# Patient Record
Sex: MALE | Race: WHITE | Employment: OTHER | URBAN - METROPOLITAN AREA
[De-identification: names, ages, dates, MRNs, and addresses within clinical notes are randomized per-mention and may not be internally consistent; named-entity substitution may affect disease eponyms.]

---

## 2017-10-28 ENCOUNTER — HOSPITAL ENCOUNTER (INPATIENT)
Age: 77
LOS: 7 days | Discharge: HOME HEALTH | DRG: 234 | End: 2017-11-04
Attending: INTERNAL MEDICINE | Admitting: INTERNAL MEDICINE
Payer: MEDICARE

## 2017-10-28 PROCEDURE — 2060000000 HC ICU INTERMEDIATE R&B

## 2017-10-29 LAB
ANION GAP SERPL CALCULATED.3IONS-SCNC: 16 MMOL/L (ref 9–17)
BUN BLDV-MCNC: 15 MG/DL (ref 8–23)
BUN/CREAT BLD: NORMAL (ref 9–20)
CALCIUM SERPL-MCNC: 9.2 MG/DL (ref 8.6–10.4)
CHLORIDE BLD-SCNC: 100 MMOL/L (ref 98–107)
CO2: 23 MMOL/L (ref 20–31)
CREAT SERPL-MCNC: 0.78 MG/DL (ref 0.7–1.2)
ESTIMATED AVERAGE GLUCOSE: 111 MG/DL
GFR AFRICAN AMERICAN: >60 ML/MIN
GFR NON-AFRICAN AMERICAN: >60 ML/MIN
GFR SERPL CREATININE-BSD FRML MDRD: NORMAL ML/MIN/{1.73_M2}
GFR SERPL CREATININE-BSD FRML MDRD: NORMAL ML/MIN/{1.73_M2}
GLUCOSE BLD-MCNC: 98 MG/DL (ref 70–99)
HBA1C MFR BLD: 5.5 % (ref 4–6)
HCT VFR BLD CALC: 39.4 % (ref 41–53)
HEMOGLOBIN: 13 G/DL (ref 13.5–17.5)
MCH RBC QN AUTO: 28.5 PG (ref 26–34)
MCHC RBC AUTO-ENTMCNC: 33 G/DL (ref 31–37)
MCV RBC AUTO: 86.4 FL (ref 80–100)
PARTIAL THROMBOPLASTIN TIME: 25 SEC (ref 21.3–31.3)
PARTIAL THROMBOPLASTIN TIME: 38.6 SEC (ref 21.3–31.3)
PARTIAL THROMBOPLASTIN TIME: 49.1 SEC (ref 21.3–31.3)
PDW BLD-RTO: 16.8 % (ref 12.5–15.4)
PLATELET # BLD: 298 K/UL (ref 140–450)
PMV BLD AUTO: 7.4 FL (ref 6–12)
POTASSIUM SERPL-SCNC: 3.8 MMOL/L (ref 3.7–5.3)
RBC # BLD: 4.56 M/UL (ref 4.5–5.9)
SODIUM BLD-SCNC: 139 MMOL/L (ref 135–144)
TROPONIN INTERP: ABNORMAL
TROPONIN T: 0.23 NG/ML
TROPONIN T: 0.26 NG/ML
TROPONIN T: 0.34 NG/ML
TROPONIN T: 0.35 NG/ML
WBC # BLD: 9.7 K/UL (ref 3.5–11)

## 2017-10-29 PROCEDURE — 93005 ELECTROCARDIOGRAM TRACING: CPT

## 2017-10-29 PROCEDURE — 6360000002 HC RX W HCPCS: Performed by: STUDENT IN AN ORGANIZED HEALTH CARE EDUCATION/TRAINING PROGRAM

## 2017-10-29 PROCEDURE — 36415 COLL VENOUS BLD VENIPUNCTURE: CPT

## 2017-10-29 PROCEDURE — 83036 HEMOGLOBIN GLYCOSYLATED A1C: CPT

## 2017-10-29 PROCEDURE — 85730 THROMBOPLASTIN TIME PARTIAL: CPT

## 2017-10-29 PROCEDURE — 6370000000 HC RX 637 (ALT 250 FOR IP): Performed by: STUDENT IN AN ORGANIZED HEALTH CARE EDUCATION/TRAINING PROGRAM

## 2017-10-29 PROCEDURE — 2060000000 HC ICU INTERMEDIATE R&B

## 2017-10-29 PROCEDURE — 80048 BASIC METABOLIC PNL TOTAL CA: CPT

## 2017-10-29 PROCEDURE — 6370000000 HC RX 637 (ALT 250 FOR IP): Performed by: INTERNAL MEDICINE

## 2017-10-29 PROCEDURE — 85027 COMPLETE CBC AUTOMATED: CPT

## 2017-10-29 PROCEDURE — 84484 ASSAY OF TROPONIN QUANT: CPT

## 2017-10-29 RX ORDER — ASPIRIN 81 MG/1
81 TABLET, CHEWABLE ORAL DAILY
Status: DISCONTINUED | OUTPATIENT
Start: 2017-10-29 | End: 2017-10-31

## 2017-10-29 RX ORDER — LISINOPRIL 2.5 MG/1
2.5 TABLET ORAL DAILY
Status: DISCONTINUED | OUTPATIENT
Start: 2017-10-29 | End: 2017-10-30

## 2017-10-29 RX ORDER — ASPIRIN 81 MG/1
162 TABLET, CHEWABLE ORAL ONCE
Status: COMPLETED | OUTPATIENT
Start: 2017-10-29 | End: 2017-10-29

## 2017-10-29 RX ORDER — ATORVASTATIN CALCIUM 40 MG/1
40 TABLET, FILM COATED ORAL NIGHTLY
Status: DISCONTINUED | OUTPATIENT
Start: 2017-10-29 | End: 2017-10-31

## 2017-10-29 RX ORDER — HEPARIN SODIUM 1000 [USP'U]/ML
4000 INJECTION, SOLUTION INTRAVENOUS; SUBCUTANEOUS PRN
Status: DISCONTINUED | OUTPATIENT
Start: 2017-10-29 | End: 2017-10-31

## 2017-10-29 RX ORDER — HEPARIN SODIUM 10000 [USP'U]/100ML
12 INJECTION, SOLUTION INTRAVENOUS CONTINUOUS
Status: DISCONTINUED | OUTPATIENT
Start: 2017-10-29 | End: 2017-10-30

## 2017-10-29 RX ORDER — NITROGLYCERIN 0.4 MG/1
0.4 TABLET SUBLINGUAL EVERY 5 MIN PRN
Status: DISCONTINUED | OUTPATIENT
Start: 2017-10-29 | End: 2017-10-31

## 2017-10-29 RX ORDER — HEPARIN SODIUM 1000 [USP'U]/ML
2000 INJECTION, SOLUTION INTRAVENOUS; SUBCUTANEOUS PRN
Status: DISCONTINUED | OUTPATIENT
Start: 2017-10-29 | End: 2017-10-31

## 2017-10-29 RX ADMIN — HEPARIN SODIUM 2000 UNITS: 1000 INJECTION, SOLUTION INTRAVENOUS; SUBCUTANEOUS at 17:35

## 2017-10-29 RX ADMIN — ASPIRIN 81 MG: 81 TABLET, CHEWABLE ORAL at 09:47

## 2017-10-29 RX ADMIN — METOPROLOL TARTRATE 12.5 MG: 25 TABLET ORAL at 20:31

## 2017-10-29 RX ADMIN — ASPIRIN 81 MG 162 MG: 81 TABLET ORAL at 00:46

## 2017-10-29 RX ADMIN — HEPARIN SODIUM AND DEXTROSE 12 UNITS/KG/HR: 10000; 5 INJECTION INTRAVENOUS at 02:19

## 2017-10-29 RX ADMIN — HEPARIN SODIUM AND DEXTROSE 14.04 UNITS/KG/HR: 10000; 5 INJECTION INTRAVENOUS at 17:31

## 2017-10-29 RX ADMIN — LISINOPRIL 2.5 MG: 2.5 TABLET ORAL at 00:46

## 2017-10-29 RX ADMIN — ATORVASTATIN CALCIUM 40 MG: 40 TABLET, FILM COATED ORAL at 20:31

## 2017-10-29 RX ADMIN — ATORVASTATIN CALCIUM 40 MG: 40 TABLET, FILM COATED ORAL at 00:46

## 2017-10-29 RX ADMIN — HEPARIN SODIUM AND DEXTROSE 14.04 UNITS/KG/HR: 10000; 5 INJECTION INTRAVENOUS at 23:16

## 2017-10-29 RX ADMIN — HEPARIN SODIUM 4000 UNITS: 1000 INJECTION, SOLUTION INTRAVENOUS; SUBCUTANEOUS at 02:19

## 2017-10-29 RX ADMIN — METOPROLOL TARTRATE 12.5 MG: 25 TABLET ORAL at 09:47

## 2017-10-29 NOTE — H&P
Admission medications    Not on File      Current Facility-Administered Medications: lisinopril (PRINIVIL;ZESTRIL) tablet 2.5 mg, 2.5 mg, Oral, Daily  atorvastatin (LIPITOR) tablet 40 mg, 40 mg, Oral, Nightly  nitroGLYCERIN (NITROSTAT) SL tablet 0.4 mg, 0.4 mg, Sublingual, Q5 Min PRN  heparin (porcine) injection 4,000 Units, 4,000 Units, Intravenous, PRN  heparin (porcine) injection 2,000 Units, 2,000 Units, Intravenous, PRN  heparin 25,000 units in dextrose 5% 250 mL infusion, 12 Units/kg/hr, Intravenous, Continuous  metoprolol tartrate (LOPRESSOR) tablet 12.5 mg, 12.5 mg, Oral, BID  aspirin chewable tablet 81 mg, 81 mg, Oral, Daily    Allergies:  Review of patient's allergies indicates no known allergies. Social History:   reports that he has never smoked. He has never used smokeless tobacco. He reports that he does not drink alcohol. Family History: family history includes No Known Problems in his father and mother. No h/o sudden cardiac death. No for premature CAD    REVIEW OF SYSTEMS:    · Constitutional: there has been no unanticipated weight loss. There's been No change in energy level, No change in activity level. · Eyes: No visual changes or diplopia. No scleral icterus. · ENT: No Headaches  · Cardiovascular: No cardiac history  · Respiratory: No previous pulmonary problems, No cough  · Gastrointestinal: No abdominal pain. No change in bowel or bladder habits. · Genitourinary: No dysuria, trouble voiding, or hematuria. · Musculoskeletal:  No gait disturbance, No weakness or joint complaints. · Integumentary: No rash or pruritis. · Neurological: No headache, diplopia, change in muscle strength, numbness or tingling. No change in gait, balance, coordination, mood, affect, memory, mentation, behavior. · Psychiatric: No anxiety, or depression. · Endocrine: No temperature intolerance. No excessive thirst, fluid intake, or urination. No tremor.   · Hematologic/Lymphatic: No abnormal bruising or bleeding, blood clots or swollen lymph nodes. · Allergic/Immunologic: No nasal congestion or hives. PHYSICAL EXAM:      /67   Pulse 70   Temp 97.4 °F (36.3 °C) (Oral)   Resp 16   Ht 5' 11\" (1.803 m)   Wt 180 lb 9.6 oz (81.9 kg)   SpO2 97%   BMI 25.19 kg/m²    Constitutional and General Appearance: alert, cooperative, no distress and appears stated age  HEENT: PERRL, no cervical lymphadenopathy. No masses palpable. Normal oral mucosa  Respiratory:  · Normal excursion and expansion without use of accessory muscles  · Resp Auscultation: Good respiratory effort. No for increased work of breathing. On auscultation: clear to auscultation bilaterally  Cardiovascular:  · The apical impulse is not displaced  · Heart tones are crisp and normal. regular S1 and S2.  · Jugular venous pulsation Normal  · The carotid upstroke is normal in amplitude and contour without delay or bruit  · Peripheral pulses are symmetrical and full   Abdomen:   · No masses or tenderness  · Bowel sounds present  Extremities:  ·  No Cyanosis or Clubbing  ·  Lower extremity edema: No  ·  Skin: Warm and dry  Neurological:  · Alert and oriented. · Moves all extremities well  · No abnormalities of mood, affect, memory, mentation, or behavior are noted    DATA:    Diagnostics:    Not available    Labs:     CBC:   Recent Labs      10/29/17   0030   WBC  9.7   HGB  13.0*   HCT  39.4*   PLT  298     BMP:   Recent Labs      10/29/17   0030   NA  139   K  3.8   CO2  23   BUN  15   CREATININE  0.78   LABGLOM  >60   GLUCOSE  98     APTT:  Recent Labs      10/29/17   0030   APTT  25.0       IMPRESSION:    There is no problem list on file for this patient. RECOMMENDATIONS:  1.  NSTEMI with NAIMA score 3  , no risk factors other than advanced age  3. Continue heparin GTT for now , in addition to ASA. 3. Lopressor 12.5 mg twice a day , lisinopril 2.5mg   4. Lipitor 40 mg daily , daily aspirin 81 mg  5.  Nothing by mouth after

## 2017-10-30 ENCOUNTER — APPOINTMENT (OUTPATIENT)
Dept: CARDIAC CATH/INVASIVE PROCEDURES | Age: 77
DRG: 234 | End: 2017-10-30
Attending: INTERNAL MEDICINE
Payer: MEDICARE

## 2017-10-30 ENCOUNTER — ANESTHESIA EVENT (OUTPATIENT)
Dept: OPERATING ROOM | Age: 77
DRG: 234 | End: 2017-10-30
Payer: MEDICARE

## 2017-10-30 ENCOUNTER — APPOINTMENT (OUTPATIENT)
Dept: GENERAL RADIOLOGY | Age: 77
DRG: 234 | End: 2017-10-30
Attending: INTERNAL MEDICINE
Payer: MEDICARE

## 2017-10-30 LAB
BILIRUBIN URINE: NEGATIVE
COLOR: YELLOW
COMMENT UA: NORMAL
CULTURE: NORMAL
EKG ATRIAL RATE: 69 BPM
EKG P AXIS: 45 DEGREES
EKG P-R INTERVAL: 242 MS
EKG Q-T INTERVAL: 416 MS
EKG QRS DURATION: 90 MS
EKG QTC CALCULATION (BAZETT): 445 MS
EKG R AXIS: 19 DEGREES
EKG T AXIS: 36 DEGREES
EKG VENTRICULAR RATE: 69 BPM
GLUCOSE URINE: NEGATIVE
KETONES, URINE: NEGATIVE
LEUKOCYTE ESTERASE, URINE: NEGATIVE
LV EF: 53 %
LVEF MODALITY: NORMAL
Lab: NORMAL
NITRITE, URINE: NEGATIVE
PARTIAL THROMBOPLASTIN TIME: 36.1 SEC (ref 21.3–31.3)
PARTIAL THROMBOPLASTIN TIME: 60.5 SEC (ref 21.3–31.3)
PH UA: 7 (ref 5–8)
PROTEIN UA: NEGATIVE
SPECIFIC GRAVITY UA: 1.02 (ref 1–1.03)
SPECIMEN DESCRIPTION: NORMAL
STATUS: NORMAL
TURBIDITY: CLEAR
URINE HGB: NEGATIVE
UROBILINOGEN, URINE: NORMAL

## 2017-10-30 PROCEDURE — 85730 THROMBOPLASTIN TIME PARTIAL: CPT

## 2017-10-30 PROCEDURE — 6370000000 HC RX 637 (ALT 250 FOR IP): Performed by: INTERNAL MEDICINE

## 2017-10-30 PROCEDURE — 81003 URINALYSIS AUTO W/O SCOPE: CPT

## 2017-10-30 PROCEDURE — 6370000000 HC RX 637 (ALT 250 FOR IP): Performed by: STUDENT IN AN ORGANIZED HEALTH CARE EDUCATION/TRAINING PROGRAM

## 2017-10-30 PROCEDURE — 71010 XR CHEST LIMITED: CPT

## 2017-10-30 PROCEDURE — C1769 GUIDE WIRE: HCPCS

## 2017-10-30 PROCEDURE — C1725 CATH, TRANSLUMIN NON-LASER: HCPCS

## 2017-10-30 PROCEDURE — C1760 CLOSURE DEV, VASC: HCPCS

## 2017-10-30 PROCEDURE — B2151ZZ FLUOROSCOPY OF LEFT HEART USING LOW OSMOLAR CONTRAST: ICD-10-PCS | Performed by: INTERNAL MEDICINE

## 2017-10-30 PROCEDURE — 87641 MR-STAPH DNA AMP PROBE: CPT

## 2017-10-30 PROCEDURE — B2111ZZ FLUOROSCOPY OF MULTIPLE CORONARY ARTERIES USING LOW OSMOLAR CONTRAST: ICD-10-PCS | Performed by: INTERNAL MEDICINE

## 2017-10-30 PROCEDURE — 6360000002 HC RX W HCPCS: Performed by: STUDENT IN AN ORGANIZED HEALTH CARE EDUCATION/TRAINING PROGRAM

## 2017-10-30 PROCEDURE — 93458 L HRT ARTERY/VENTRICLE ANGIO: CPT | Performed by: INTERNAL MEDICINE

## 2017-10-30 PROCEDURE — 93306 TTE W/DOPPLER COMPLETE: CPT

## 2017-10-30 PROCEDURE — 6370000000 HC RX 637 (ALT 250 FOR IP): Performed by: PHYSICIAN ASSISTANT

## 2017-10-30 PROCEDURE — 36415 COLL VENOUS BLD VENIPUNCTURE: CPT

## 2017-10-30 PROCEDURE — C1894 INTRO/SHEATH, NON-LASER: HCPCS

## 2017-10-30 PROCEDURE — 99221 1ST HOSP IP/OBS SF/LOW 40: CPT | Performed by: PHYSICIAN ASSISTANT

## 2017-10-30 PROCEDURE — 6360000002 HC RX W HCPCS

## 2017-10-30 PROCEDURE — 4A023N7 MEASUREMENT OF CARDIAC SAMPLING AND PRESSURE, LEFT HEART, PERCUTANEOUS APPROACH: ICD-10-PCS | Performed by: INTERNAL MEDICINE

## 2017-10-30 PROCEDURE — 6370000000 HC RX 637 (ALT 250 FOR IP)

## 2017-10-30 PROCEDURE — 2060000000 HC ICU INTERMEDIATE R&B

## 2017-10-30 RX ORDER — SODIUM CHLORIDE 0.9 % (FLUSH) 0.9 %
10 SYRINGE (ML) INJECTION PRN
Status: DISCONTINUED | OUTPATIENT
Start: 2017-10-30 | End: 2017-10-31

## 2017-10-30 RX ORDER — CHLORHEXIDINE GLUCONATE 4 G/100ML
SOLUTION TOPICAL
Status: COMPLETED
Start: 2017-10-30 | End: 2017-10-30

## 2017-10-30 RX ORDER — SODIUM CHLORIDE 0.9 % (FLUSH) 0.9 %
10 SYRINGE (ML) INJECTION EVERY 12 HOURS SCHEDULED
Status: DISCONTINUED | OUTPATIENT
Start: 2017-10-30 | End: 2017-10-31

## 2017-10-30 RX ORDER — ACETAMINOPHEN 325 MG/1
650 TABLET ORAL EVERY 4 HOURS PRN
Status: DISCONTINUED | OUTPATIENT
Start: 2017-10-30 | End: 2017-10-31

## 2017-10-30 RX ORDER — ONDANSETRON 2 MG/ML
4 INJECTION INTRAMUSCULAR; INTRAVENOUS EVERY 6 HOURS PRN
Status: DISCONTINUED | OUTPATIENT
Start: 2017-10-30 | End: 2017-10-31

## 2017-10-30 RX ORDER — HEPARIN SODIUM 10000 [USP'U]/100ML
12 INJECTION, SOLUTION INTRAVENOUS CONTINUOUS
Status: DISCONTINUED | OUTPATIENT
Start: 2017-10-30 | End: 2017-10-31

## 2017-10-30 RX ADMIN — METOPROLOL TARTRATE 12.5 MG: 25 TABLET ORAL at 08:32

## 2017-10-30 RX ADMIN — ASPIRIN 81 MG: 81 TABLET, CHEWABLE ORAL at 08:32

## 2017-10-30 RX ADMIN — LISINOPRIL 2.5 MG: 2.5 TABLET ORAL at 08:32

## 2017-10-30 RX ADMIN — HEPARIN SODIUM 2000 UNITS: 1000 INJECTION, SOLUTION INTRAVENOUS; SUBCUTANEOUS at 23:35

## 2017-10-30 RX ADMIN — MUPIROCIN: 20 OINTMENT TOPICAL at 23:14

## 2017-10-30 RX ADMIN — ATORVASTATIN CALCIUM 40 MG: 40 TABLET, FILM COATED ORAL at 22:07

## 2017-10-30 RX ADMIN — METOPROLOL TARTRATE 12.5 MG: 25 TABLET ORAL at 22:08

## 2017-10-30 RX ADMIN — CHLORHEXIDINE GLUCONATE: 4 SOLUTION TOPICAL at 22:10

## 2017-10-30 ASSESSMENT — PAIN SCALES - GENERAL
PAINLEVEL_OUTOF10: 0

## 2017-10-30 ASSESSMENT — LIFESTYLE VARIABLES: SMOKING_STATUS: 0

## 2017-10-30 NOTE — PROCEDURES
[facility]        Date:   10/30/2017  Patient name: Hadley Crain  Date of admission:  10/28/2017 10:42 PM  MRN:   [id number]  YOB: 1940    CARDIAC CATHETERIZATION    Operators:  Primary: Dr. Idania Cuadra  Assistant: Dr. Javier Watts    Indications for cath: NSTEMI    Procedure performed: left heart cath and left ventriculogram    Access: Right Femoral artery    Procedure: After informed consent was obtained with explanation of the risks and benefits, patient was brought to the cath lab. The right groin were prepped and draped in sterile fashion. 1% lidocaine was used for local block. The Femoral artery was cannulated with 6  Fr sheath with brisk arterial blood return. The side port was frequently flushed and aspirated with normal saline. Findings:    Left main: 60% ostial stenosis                    50% distal stenosis  LAD: 75% Proximal stenosis           80% Mid stenosis  LCX: Luminal ir regularities 20-30%. RCA: 80% Proximal stenosis            RPDA: 80% Ostial stenosis    The LV gram was performed in the FELIX 30 position. LVEF: 60%. LV Wall Motion: normal    Conclusions:    1. Left main with Multivessel disease  2. Normal LV function    Recommendation:    1. CTS consult for the CABG      Electronically signed by Roxianne Osgood, MD on 10/30/2017 at 12:53 PM  [facility]. Attending Physician Statement  I have discussed the case of Hadley Crain including pertinent history and exam findings with the resident. I have seen and examined the patient and the key elements of the encounter have been performed by me. I agree with the assessment, plan and orders as documented by the resident With changes made to the note. Procedure performed by me.     Electronically signed by Ramon Nunn MD on 10/31/2017 at 10:10 AM.  [facility]      [phone]

## 2017-10-30 NOTE — CONSULTS
express understanding and wish to proceed with surgical intervention. Alona Conner  This patient was seen with James Barragan PA-C and agree with documentation as scribed. I have seen and examined the patient, agree with the assessment and management. Care plan has been discussed with pt and his family, they desire to proceed with CABG.   Erik Kwan MD

## 2017-10-30 NOTE — PLAN OF CARE
Problem: Falls - Risk of:  Goal: Will remain free from falls  Will remain free from falls   Outcome: Ongoing  Remains free from falls; wears non skid socks, fall risk education, calls out with call light when wanting to get up.

## 2017-10-30 NOTE — ANESTHESIA PRE PROCEDURE
Rheumatoid arteritis        Past Surgical History:        Procedure Laterality Date    EYE SURGERY      cataracts    JOINT REPLACEMENT Right 05/18/2017    hip reeplacement        Social History:    Social History   Substance Use Topics    Smoking status: Never Smoker    Smokeless tobacco: Never Used    Alcohol use No                                Counseling given: Not Answered      Vital Signs (Current):   Vitals:    10/30/17 0330 10/30/17 0400 10/30/17 0732 10/30/17 0734   BP: 106/64   (!) 128/57   Pulse: 62 63 60 60   Resp: 16      Temp: 36.1 °C (97 °F)  36.6 °C (97.9 °F)    TempSrc: Oral  Oral    SpO2: 96%  92%    Weight:       Height:                                                  BP Readings from Last 3 Encounters:   10/30/17 (!) 128/57       NPO Status:                                                                                 BMI:   Wt Readings from Last 3 Encounters:   10/28/17 180 lb 9.6 oz (81.9 kg)     Body mass index is 25.19 kg/m². CBC:   Lab Results   Component Value Date    WBC 9.7 10/29/2017    RBC 4.56 10/29/2017    HGB 13.0 10/29/2017    HCT 39.4 10/29/2017    MCV 86.4 10/29/2017    RDW 16.8 10/29/2017     10/29/2017       CMP:   Lab Results   Component Value Date     10/29/2017    K 3.8 10/29/2017     10/29/2017    CO2 23 10/29/2017    BUN 15 10/29/2017    CREATININE 0.78 10/29/2017    GFRAA >60 10/29/2017    LABGLOM >60 10/29/2017    GLUCOSE 98 10/29/2017    CALCIUM 9.2 10/29/2017       POC Tests: No results for input(s): POCGLU, POCNA, POCK, POCCL, POCBUN, POCHEMO, POCHCT in the last 72 hours.     Coags:   Lab Results   Component Value Date    APTT 60.5 10/30/2017       HCG (If Applicable): No results found for: PREGTESTUR, PREGSERUM, HCG, HCGQUANT     ABGs: No results found for: PHART, PO2ART, QIT7PHM, JWI9IYS, BEART, L6LPEZVD     Type & Screen (If Applicable):  No results found for: LABABO, [address]    Anesthesia Evaluation  Patient summary reviewed  Airway: reviewed and agrees with Pre Eval content              Wolm Shone, CRNA   10/30/2017

## 2017-10-30 NOTE — FLOWSHEET NOTE
Pt was resting in bed on this initial visit. Pt was receptive to  presence and was engaging.  offered words of comfort and prayer. Pt appears to be coping with hospitalization. Pt expressed gratitude for visit.       10/29/17 2058   Encounter Summary   Services provided to: Patient   Referral/Consult From: [facility] (10/29/17)   Complexity of Encounter Moderate   Length of Encounter 30 minutes   Routine   Type Initial   Assessment Approachable;Calm   Intervention Active listening;Prayer;Sustaining presence/ Ministry of presence   Outcome Coping

## 2017-10-31 ENCOUNTER — APPOINTMENT (OUTPATIENT)
Dept: GENERAL RADIOLOGY | Age: 77
DRG: 234 | End: 2017-10-31
Attending: INTERNAL MEDICINE
Payer: MEDICARE

## 2017-10-31 ENCOUNTER — ANESTHESIA (OUTPATIENT)
Dept: OPERATING ROOM | Age: 77
DRG: 234 | End: 2017-10-31
Payer: MEDICARE

## 2017-10-31 VITALS
RESPIRATION RATE: 12 BRPM | OXYGEN SATURATION: 100 % | TEMPERATURE: 94.7 F | DIASTOLIC BLOOD PRESSURE: 47 MMHG | SYSTOLIC BLOOD PRESSURE: 90 MMHG

## 2017-10-31 LAB
ALLEN TEST: ABNORMAL
ANION GAP SERPL CALCULATED.3IONS-SCNC: 13 MMOL/L (ref 9–17)
ANION GAP: 13 MMOL/L (ref 7–16)
BLOOD BANK SPECIMEN: NORMAL
BUN BLDV-MCNC: 23 MG/DL (ref 8–23)
BUN/CREAT BLD: ABNORMAL (ref 9–20)
CALCIUM SERPL-MCNC: 8.5 MG/DL (ref 8.6–10.4)
CHLORIDE BLD-SCNC: 103 MMOL/L (ref 98–107)
CO2: 21 MMOL/L (ref 20–31)
CREAT SERPL-MCNC: 0.86 MG/DL (ref 0.7–1.2)
DIRECT EXAM: NORMAL
FIO2: 60
FIO2: 60
FIO2: ABNORMAL
GFR AFRICAN AMERICAN: >60 ML/MIN
GFR NON-AFRICAN AMERICAN: >60 ML/MIN
GFR NON-AFRICAN AMERICAN: >60 ML/MIN
GFR SERPL CREATININE-BSD FRML MDRD: >60 ML/MIN
GFR SERPL CREATININE-BSD FRML MDRD: ABNORMAL ML/MIN/{1.73_M2}
GFR SERPL CREATININE-BSD FRML MDRD: ABNORMAL ML/MIN/{1.73_M2}
GFR SERPL CREATININE-BSD FRML MDRD: NORMAL ML/MIN/{1.73_M2}
GLUCOSE BLD-MCNC: 102 MG/DL (ref 74–100)
GLUCOSE BLD-MCNC: 105 MG/DL (ref 74–100)
GLUCOSE BLD-MCNC: 89 MG/DL (ref 70–99)
GLUCOSE BLD-MCNC: 91 MG/DL (ref 74–100)
GLUCOSE BLD-MCNC: 93 MG/DL (ref 74–100)
GLUCOSE BLD-MCNC: 96 MG/DL (ref 74–100)
GLUCOSE BLD-MCNC: 96 MG/DL (ref 74–100)
Lab: NORMAL
MODE: ABNORMAL
NEGATIVE BASE EXCESS, ART: 1 (ref 0–2)
NEGATIVE BASE EXCESS, ART: 2 (ref 0–2)
NEGATIVE BASE EXCESS, ART: 2 (ref 0–2)
NEGATIVE BASE EXCESS, ART: ABNORMAL (ref 0–2)
O2 DEVICE/FLOW/%: ABNORMAL
PARTIAL THROMBOPLASTIN TIME: 61.5 SEC (ref 21.3–31.3)
PARTIAL THROMBOPLASTIN TIME: 64.2 SEC (ref 21.3–31.3)
PATIENT TEMP: 35
PATIENT TEMP: 35.1
PATIENT TEMP: ABNORMAL
PLATELET # BLD: 247 K/UL (ref 138–453)
POC CHLORIDE: 111 MMOL/L (ref 98–107)
POC CREATININE: 0.53 MG/DL (ref 0.51–1.19)
POC HCO3: 21.3 MMOL/L (ref 21–28)
POC HCO3: 21.7 MMOL/L (ref 21–28)
POC HCO3: 24 MMOL/L (ref 21–28)
POC HCO3: 25.6 MMOL/L (ref 21–28)
POC HCO3: 25.6 MMOL/L (ref 21–28)
POC HCO3: 26 MMOL/L (ref 21–28)
POC HCO3: 26 MMOL/L (ref 21–28)
POC HEMATOCRIT: 27 % (ref 41–53)
POC HEMATOCRIT: 29 % (ref 41–53)
POC HEMATOCRIT: 34 % (ref 41–53)
POC HEMATOCRIT: 36 % (ref 41–53)
POC HEMATOCRIT: 36 % (ref 41–53)
POC HEMATOCRIT: 42 % (ref 41–53)
POC HEMOGLOBIN: 10 G/DL (ref 13.5–17.5)
POC HEMOGLOBIN: 11.5 G/DL (ref 13.5–17.5)
POC HEMOGLOBIN: 12.1 G/DL (ref 13.5–17.5)
POC HEMOGLOBIN: 12.2 G/DL (ref 13.5–17.5)
POC HEMOGLOBIN: 14.4 G/DL (ref 13.5–17.5)
POC HEMOGLOBIN: 9.2 G/DL (ref 13.5–17.5)
POC IONIZED CALCIUM: 1.16 MMOL/L (ref 1.15–1.33)
POC IONIZED CALCIUM: 1.19 MMOL/L (ref 1.15–1.33)
POC IONIZED CALCIUM: 1.19 MMOL/L (ref 1.15–1.33)
POC IONIZED CALCIUM: 1.2 MMOL/L (ref 1.15–1.33)
POC IONIZED CALCIUM: 1.24 MMOL/L (ref 1.15–1.33)
POC IONIZED CALCIUM: 1.25 MMOL/L (ref 1.15–1.33)
POC LACTIC ACID: 0.82 MMOL/L (ref 0.56–1.39)
POC O2 SATURATION: 100 % (ref 94–98)
POC PCO2 TEMP: 28 MM HG
POC PCO2 TEMP: 28 MM HG
POC PCO2 TEMP: ABNORMAL MM HG
POC PCO2: 30.1 MM HG (ref 35–48)
POC PCO2: 30.2 MM HG (ref 35–48)
POC PCO2: 36.8 MM HG (ref 35–48)
POC PCO2: 37.6 MM HG (ref 35–48)
POC PCO2: 38.6 MM HG (ref 35–48)
POC PCO2: 39.3 MM HG (ref 35–48)
POC PCO2: 42.7 MM HG (ref 35–48)
POC PH TEMP: 7.49
POC PH TEMP: 7.49
POC PH TEMP: ABNORMAL
POC PH: 7.39 (ref 7.35–7.45)
POC PH: 7.4 (ref 7.35–7.45)
POC PH: 7.42 (ref 7.35–7.45)
POC PH: 7.45 (ref 7.35–7.45)
POC PH: 7.45 (ref 7.35–7.45)
POC PH: 7.46 (ref 7.35–7.45)
POC PH: 7.46 (ref 7.35–7.45)
POC PO2 TEMP: 146 MM HG
POC PO2 TEMP: 158 MM HG
POC PO2 TEMP: ABNORMAL MM HG
POC PO2: 157 MM HG (ref 83–108)
POC PO2: 167.9 MM HG (ref 83–108)
POC PO2: 193.4 MM HG (ref 83–108)
POC PO2: 222.4 MM HG (ref 83–108)
POC PO2: 231.3 MM HG (ref 83–108)
POC PO2: 256.2 MM HG (ref 83–108)
POC PO2: 285 MM HG (ref 83–108)
POC POTASSIUM: 3.3 MMOL/L (ref 3.5–4.5)
POC POTASSIUM: 3.5 MMOL/L (ref 3.5–4.5)
POC POTASSIUM: 3.6 MMOL/L (ref 3.5–4.5)
POC POTASSIUM: 3.7 MMOL/L (ref 3.5–4.5)
POC POTASSIUM: 3.7 MMOL/L (ref 3.5–4.5)
POC POTASSIUM: 3.8 MMOL/L (ref 3.5–4.5)
POC SODIUM: 145 MMOL/L (ref 138–146)
POSITIVE BASE EXCESS, ART: 1 (ref 0–3)
POSITIVE BASE EXCESS, ART: 1 (ref 0–3)
POSITIVE BASE EXCESS, ART: 2 (ref 0–3)
POSITIVE BASE EXCESS, ART: 2 (ref 0–3)
POSITIVE BASE EXCESS, ART: ABNORMAL (ref 0–3)
POTASSIUM SERPL-SCNC: 4.1 MMOL/L (ref 3.7–5.3)
SAMPLE SITE: ABNORMAL
SODIUM BLD-SCNC: 137 MMOL/L (ref 135–144)
SPECIMEN DESCRIPTION: NORMAL
STATUS: NORMAL
TCO2 (CALC), ART: 22 MMOL/L (ref 22–29)
TCO2 (CALC), ART: 23 MMOL/L (ref 22–29)
TCO2 (CALC), ART: 25 MMOL/L (ref 22–29)
TCO2 (CALC), ART: 27 MMOL/L (ref 22–29)

## 2017-10-31 PROCEDURE — 80048 BASIC METABOLIC PNL TOTAL CA: CPT

## 2017-10-31 PROCEDURE — 2720000010 HC SURG SUPPLY STERILE: Performed by: THORACIC SURGERY (CARDIOTHORACIC VASCULAR SURGERY)

## 2017-10-31 PROCEDURE — A4648 IMPLANTABLE TISSUE MARKER: HCPCS | Performed by: THORACIC SURGERY (CARDIOTHORACIC VASCULAR SURGERY)

## 2017-10-31 PROCEDURE — 82435 ASSAY OF BLOOD CHLORIDE: CPT

## 2017-10-31 PROCEDURE — P9045 ALBUMIN (HUMAN), 5%, 250 ML: HCPCS | Performed by: NURSE ANESTHETIST, CERTIFIED REGISTERED

## 2017-10-31 PROCEDURE — 82330 ASSAY OF CALCIUM: CPT

## 2017-10-31 PROCEDURE — 6370000000 HC RX 637 (ALT 250 FOR IP): Performed by: THORACIC SURGERY (CARDIOTHORACIC VASCULAR SURGERY)

## 2017-10-31 PROCEDURE — 021309W BYPASS CORONARY ARTERY, FOUR OR MORE ARTERIES FROM AORTA WITH AUTOLOGOUS VENOUS TISSUE, OPEN APPROACH: ICD-10-PCS | Performed by: THORACIC SURGERY (CARDIOTHORACIC VASCULAR SURGERY)

## 2017-10-31 PROCEDURE — 36415 COLL VENOUS BLD VENIPUNCTURE: CPT

## 2017-10-31 PROCEDURE — 6370000000 HC RX 637 (ALT 250 FOR IP): Performed by: PHYSICIAN ASSISTANT

## 2017-10-31 PROCEDURE — 06BP4ZZ EXCISION OF RIGHT SAPHENOUS VEIN, PERCUTANEOUS ENDOSCOPIC APPROACH: ICD-10-PCS | Performed by: THORACIC SURGERY (CARDIOTHORACIC VASCULAR SURGERY)

## 2017-10-31 PROCEDURE — P9041 ALBUMIN (HUMAN),5%, 50ML: HCPCS

## 2017-10-31 PROCEDURE — 6360000002 HC RX W HCPCS: Performed by: NURSE ANESTHETIST, CERTIFIED REGISTERED

## 2017-10-31 PROCEDURE — 84295 ASSAY OF SERUM SODIUM: CPT

## 2017-10-31 PROCEDURE — 2580000003 HC RX 258: Performed by: PHYSICIAN ASSISTANT

## 2017-10-31 PROCEDURE — 85730 THROMBOPLASTIN TIME PARTIAL: CPT

## 2017-10-31 PROCEDURE — 83735 ASSAY OF MAGNESIUM: CPT

## 2017-10-31 PROCEDURE — C1729 CATH, DRAINAGE: HCPCS | Performed by: THORACIC SURGERY (CARDIOTHORACIC VASCULAR SURGERY)

## 2017-10-31 PROCEDURE — 94002 VENT MGMT INPAT INIT DAY: CPT

## 2017-10-31 PROCEDURE — 3700000001 HC ADD 15 MINUTES (ANESTHESIA): Performed by: THORACIC SURGERY (CARDIOTHORACIC VASCULAR SURGERY)

## 2017-10-31 PROCEDURE — B24BZZ4 ULTRASONOGRAPHY OF HEART WITH AORTA, TRANSESOPHAGEAL: ICD-10-PCS | Performed by: ANESTHESIOLOGY

## 2017-10-31 PROCEDURE — 94770 HC ETCO2 MONITOR DAILY: CPT

## 2017-10-31 PROCEDURE — 2580000003 HC RX 258: Performed by: NURSE ANESTHETIST, CERTIFIED REGISTERED

## 2017-10-31 PROCEDURE — 6360000002 HC RX W HCPCS: Performed by: THORACIC SURGERY (CARDIOTHORACIC VASCULAR SURGERY)

## 2017-10-31 PROCEDURE — 3600000008 HC SURGERY OHS BASE: Performed by: THORACIC SURGERY (CARDIOTHORACIC VASCULAR SURGERY)

## 2017-10-31 PROCEDURE — 94762 N-INVAS EAR/PLS OXIMTRY CONT: CPT

## 2017-10-31 PROCEDURE — 85027 COMPLETE CBC AUTOMATED: CPT

## 2017-10-31 PROCEDURE — 82947 ASSAY GLUCOSE BLOOD QUANT: CPT

## 2017-10-31 PROCEDURE — 86901 BLOOD TYPING SEROLOGIC RH(D): CPT

## 2017-10-31 PROCEDURE — 3600000018 HC SURGERY OHS ADDTL 15MIN: Performed by: THORACIC SURGERY (CARDIOTHORACIC VASCULAR SURGERY)

## 2017-10-31 PROCEDURE — 82803 BLOOD GASES ANY COMBINATION: CPT

## 2017-10-31 PROCEDURE — 6360000002 HC RX W HCPCS

## 2017-10-31 PROCEDURE — 93005 ELECTROCARDIOGRAM TRACING: CPT

## 2017-10-31 PROCEDURE — 86850 RBC ANTIBODY SCREEN: CPT

## 2017-10-31 PROCEDURE — 85049 AUTOMATED PLATELET COUNT: CPT

## 2017-10-31 PROCEDURE — 86900 BLOOD TYPING SEROLOGIC ABO: CPT

## 2017-10-31 PROCEDURE — 2500000003 HC RX 250 WO HCPCS: Performed by: NURSE ANESTHETIST, CERTIFIED REGISTERED

## 2017-10-31 PROCEDURE — 84132 ASSAY OF SERUM POTASSIUM: CPT

## 2017-10-31 PROCEDURE — 02100Z9 BYPASS CORONARY ARTERY, ONE ARTERY FROM LEFT INTERNAL MAMMARY, OPEN APPROACH: ICD-10-PCS | Performed by: THORACIC SURGERY (CARDIOTHORACIC VASCULAR SURGERY)

## 2017-10-31 PROCEDURE — 2580000003 HC RX 258: Performed by: THORACIC SURGERY (CARDIOTHORACIC VASCULAR SURGERY)

## 2017-10-31 PROCEDURE — 6370000000 HC RX 637 (ALT 250 FOR IP): Performed by: NURSE ANESTHETIST, CERTIFIED REGISTERED

## 2017-10-31 PROCEDURE — 86920 COMPATIBILITY TEST SPIN: CPT

## 2017-10-31 PROCEDURE — C1875 STENT, COATED/COV W/O DEL SY: HCPCS | Performed by: THORACIC SURGERY (CARDIOTHORACIC VASCULAR SURGERY)

## 2017-10-31 PROCEDURE — 6360000002 HC RX W HCPCS: Performed by: STUDENT IN AN ORGANIZED HEALTH CARE EDUCATION/TRAINING PROGRAM

## 2017-10-31 PROCEDURE — 99999 PR OFFICE/OUTPT VISIT,PROCEDURE ONLY: CPT | Performed by: THORACIC SURGERY (CARDIOTHORACIC VASCULAR SURGERY)

## 2017-10-31 PROCEDURE — S0028 INJECTION, FAMOTIDINE, 20 MG: HCPCS | Performed by: THORACIC SURGERY (CARDIOTHORACIC VASCULAR SURGERY)

## 2017-10-31 PROCEDURE — 71010 XR CHEST PORTABLE: CPT

## 2017-10-31 PROCEDURE — 85014 HEMATOCRIT: CPT

## 2017-10-31 PROCEDURE — 87086 URINE CULTURE/COLONY COUNT: CPT

## 2017-10-31 PROCEDURE — 6370000000 HC RX 637 (ALT 250 FOR IP): Performed by: INTERNAL MEDICINE

## 2017-10-31 PROCEDURE — 3700000000 HC ANESTHESIA ATTENDED CARE: Performed by: THORACIC SURGERY (CARDIOTHORACIC VASCULAR SURGERY)

## 2017-10-31 PROCEDURE — 82565 ASSAY OF CREATININE: CPT

## 2017-10-31 PROCEDURE — C1773 RET DEV, INSERTABLE: HCPCS | Performed by: THORACIC SURGERY (CARDIOTHORACIC VASCULAR SURGERY)

## 2017-10-31 PROCEDURE — 2100000001 HC CVICU R&B

## 2017-10-31 PROCEDURE — 2500000003 HC RX 250 WO HCPCS: Performed by: THORACIC SURGERY (CARDIOTHORACIC VASCULAR SURGERY)

## 2017-10-31 PROCEDURE — 83605 ASSAY OF LACTIC ACID: CPT

## 2017-10-31 DEVICE — U-SHAPED STYLE, SILICONE (1 PER STERILE PKG)
Type: IMPLANTABLE DEVICE | Site: HEART | Status: FUNCTIONAL
Brand: SCANLAN® A/C LOCATOR® GRAFT MARKER

## 2017-10-31 RX ORDER — ASPIRIN 81 MG/1
81 TABLET ORAL DAILY
Status: DISCONTINUED | OUTPATIENT
Start: 2017-10-31 | End: 2017-11-01

## 2017-10-31 RX ORDER — ACETAMINOPHEN 325 MG/1
650 TABLET ORAL EVERY 4 HOURS PRN
Status: DISCONTINUED | OUTPATIENT
Start: 2017-10-31 | End: 2017-11-04 | Stop reason: HOSPADM

## 2017-10-31 RX ORDER — SIMVASTATIN 20 MG
20 TABLET ORAL NIGHTLY
Status: DISCONTINUED | OUTPATIENT
Start: 2017-11-01 | End: 2017-11-04

## 2017-10-31 RX ORDER — CEFAZOLIN SODIUM 1 G/3ML
INJECTION, POWDER, FOR SOLUTION INTRAMUSCULAR; INTRAVENOUS PRN
Status: DISCONTINUED | OUTPATIENT
Start: 2017-10-31 | End: 2017-10-31 | Stop reason: SDUPTHER

## 2017-10-31 RX ORDER — FENTANYL CITRATE 50 UG/ML
50 INJECTION, SOLUTION INTRAMUSCULAR; INTRAVENOUS
Status: DISCONTINUED | OUTPATIENT
Start: 2017-10-31 | End: 2017-11-04 | Stop reason: HOSPADM

## 2017-10-31 RX ORDER — PROPOFOL 10 MG/ML
INJECTION, EMULSION INTRAVENOUS PRN
Status: DISCONTINUED | OUTPATIENT
Start: 2017-10-31 | End: 2017-10-31 | Stop reason: SDUPTHER

## 2017-10-31 RX ORDER — MAGNESIUM HYDROXIDE 1200 MG/15ML
LIQUID ORAL CONTINUOUS PRN
Status: DISCONTINUED | OUTPATIENT
Start: 2017-10-31 | End: 2017-10-31 | Stop reason: HOSPADM

## 2017-10-31 RX ORDER — PROPOFOL 10 MG/ML
INJECTION, EMULSION INTRAVENOUS CONTINUOUS PRN
Status: DISCONTINUED | OUTPATIENT
Start: 2017-10-31 | End: 2017-10-31 | Stop reason: SDUPTHER

## 2017-10-31 RX ORDER — SODIUM CHLORIDE 0.9 % (FLUSH) 0.9 %
10 SYRINGE (ML) INJECTION EVERY 12 HOURS SCHEDULED
Status: DISCONTINUED | OUTPATIENT
Start: 2017-10-31 | End: 2017-10-31

## 2017-10-31 RX ORDER — AMIODARONE HYDROCHLORIDE 200 MG/1
200 TABLET ORAL 3 TIMES DAILY
Status: DISCONTINUED | OUTPATIENT
Start: 2017-10-31 | End: 2017-11-01

## 2017-10-31 RX ORDER — NITROGLYCERIN 20 MG/100ML
INJECTION INTRAVENOUS PRN
Status: DISCONTINUED | OUTPATIENT
Start: 2017-10-31 | End: 2017-10-31 | Stop reason: SDUPTHER

## 2017-10-31 RX ORDER — SODIUM CHLORIDE 0.9 % (FLUSH) 0.9 %
10 SYRINGE (ML) INJECTION PRN
Status: DISCONTINUED | OUTPATIENT
Start: 2017-10-31 | End: 2017-10-31

## 2017-10-31 RX ORDER — POTASSIUM CHLORIDE 29.8 MG/ML
20 INJECTION INTRAVENOUS PRN
Status: DISCONTINUED | OUTPATIENT
Start: 2017-10-31 | End: 2017-11-04 | Stop reason: HOSPADM

## 2017-10-31 RX ORDER — ROCURONIUM BROMIDE 10 MG/ML
INJECTION, SOLUTION INTRAVENOUS PRN
Status: DISCONTINUED | OUTPATIENT
Start: 2017-10-31 | End: 2017-10-31 | Stop reason: SDUPTHER

## 2017-10-31 RX ORDER — FENTANYL CITRATE 50 UG/ML
INJECTION, SOLUTION INTRAMUSCULAR; INTRAVENOUS PRN
Status: DISCONTINUED | OUTPATIENT
Start: 2017-10-31 | End: 2017-10-31 | Stop reason: SDUPTHER

## 2017-10-31 RX ORDER — SODIUM CHLORIDE 0.9 % (FLUSH) 0.9 %
10 SYRINGE (ML) INJECTION PRN
Status: DISCONTINUED | OUTPATIENT
Start: 2017-10-31 | End: 2017-11-04 | Stop reason: HOSPADM

## 2017-10-31 RX ORDER — M-VIT,TX,IRON,MINS/CALC/FOLIC 27MG-0.4MG
1 TABLET ORAL
Status: DISCONTINUED | OUTPATIENT
Start: 2017-11-01 | End: 2017-11-04 | Stop reason: HOSPADM

## 2017-10-31 RX ORDER — PAPAVERINE HYDROCHLORIDE 30 MG/ML
INJECTION INTRAMUSCULAR; INTRAVENOUS PRN
Status: DISCONTINUED | OUTPATIENT
Start: 2017-10-31 | End: 2017-10-31 | Stop reason: HOSPADM

## 2017-10-31 RX ORDER — ALBUMIN, HUMAN INJ 5% 5 %
SOLUTION INTRAVENOUS PRN
Status: DISCONTINUED | OUTPATIENT
Start: 2017-10-31 | End: 2017-10-31 | Stop reason: SDUPTHER

## 2017-10-31 RX ORDER — MEPERIDINE HYDROCHLORIDE 50 MG/ML
25 INJECTION INTRAMUSCULAR; INTRAVENOUS; SUBCUTANEOUS
Status: ACTIVE | OUTPATIENT
Start: 2017-10-31 | End: 2017-10-31

## 2017-10-31 RX ORDER — SODIUM CHLORIDE 9 MG/ML
INJECTION, SOLUTION INTRAVENOUS CONTINUOUS PRN
Status: DISCONTINUED | OUTPATIENT
Start: 2017-10-31 | End: 2017-10-31 | Stop reason: SDUPTHER

## 2017-10-31 RX ORDER — MAGNESIUM SULFATE 1 G/100ML
1 INJECTION INTRAVENOUS PRN
Status: DISCONTINUED | OUTPATIENT
Start: 2017-10-31 | End: 2017-11-04 | Stop reason: HOSPADM

## 2017-10-31 RX ORDER — PROTAMINE SULFATE 10 MG/ML
INJECTION, SOLUTION INTRAVENOUS PRN
Status: DISCONTINUED | OUTPATIENT
Start: 2017-10-31 | End: 2017-10-31 | Stop reason: SDUPTHER

## 2017-10-31 RX ORDER — ONDANSETRON 2 MG/ML
4 INJECTION INTRAMUSCULAR; INTRAVENOUS EVERY 8 HOURS PRN
Status: DISCONTINUED | OUTPATIENT
Start: 2017-10-31 | End: 2017-11-04 | Stop reason: HOSPADM

## 2017-10-31 RX ORDER — LIDOCAINE HYDROCHLORIDE 10 MG/ML
INJECTION, SOLUTION EPIDURAL; INFILTRATION; INTRACAUDAL; PERINEURAL PRN
Status: DISCONTINUED | OUTPATIENT
Start: 2017-10-31 | End: 2017-10-31 | Stop reason: SDUPTHER

## 2017-10-31 RX ORDER — PROPOFOL 10 MG/ML
25 INJECTION, EMULSION INTRAVENOUS
Status: DISCONTINUED | OUTPATIENT
Start: 2017-10-31 | End: 2017-11-01

## 2017-10-31 RX ORDER — SODIUM CHLORIDE 9 MG/ML
INJECTION, SOLUTION INTRAVENOUS CONTINUOUS
Status: DISCONTINUED | OUTPATIENT
Start: 2017-10-31 | End: 2017-10-31

## 2017-10-31 RX ORDER — DEXTROSE MONOHYDRATE 50 MG/ML
100 INJECTION, SOLUTION INTRAVENOUS PRN
Status: DISCONTINUED | OUTPATIENT
Start: 2017-10-31 | End: 2017-11-04 | Stop reason: HOSPADM

## 2017-10-31 RX ORDER — DIPHENHYDRAMINE HCL 25 MG
25 TABLET ORAL NIGHTLY PRN
Status: DISCONTINUED | OUTPATIENT
Start: 2017-11-01 | End: 2017-11-04 | Stop reason: HOSPADM

## 2017-10-31 RX ORDER — NICOTINE POLACRILEX 4 MG
15 LOZENGE BUCCAL PRN
Status: DISCONTINUED | OUTPATIENT
Start: 2017-10-31 | End: 2017-11-04 | Stop reason: HOSPADM

## 2017-10-31 RX ORDER — CHLORHEXIDINE GLUCONATE 0.12 MG/ML
15 RINSE ORAL 2 TIMES DAILY
Status: DISCONTINUED | OUTPATIENT
Start: 2017-10-31 | End: 2017-11-04 | Stop reason: HOSPADM

## 2017-10-31 RX ORDER — ASPIRIN 300 MG/1
150 SUPPOSITORY RECTAL ONCE
Status: DISCONTINUED | OUTPATIENT
Start: 2017-10-31 | End: 2017-11-03

## 2017-10-31 RX ORDER — CALCIUM CHLORIDE 100 MG/ML
INJECTION INTRAVENOUS; INTRAVENTRICULAR PRN
Status: DISCONTINUED | OUTPATIENT
Start: 2017-10-31 | End: 2017-10-31 | Stop reason: SDUPTHER

## 2017-10-31 RX ORDER — HYDRALAZINE HYDROCHLORIDE 20 MG/ML
5 INJECTION INTRAMUSCULAR; INTRAVENOUS EVERY 5 MIN PRN
Status: DISCONTINUED | OUTPATIENT
Start: 2017-10-31 | End: 2017-11-04 | Stop reason: HOSPADM

## 2017-10-31 RX ORDER — CHLORHEXIDINE GLUCONATE 0.12 MG/ML
15 RINSE ORAL ONCE
Status: COMPLETED | OUTPATIENT
Start: 2017-10-31 | End: 2017-10-31

## 2017-10-31 RX ORDER — MIDAZOLAM HYDROCHLORIDE 1 MG/ML
INJECTION INTRAMUSCULAR; INTRAVENOUS PRN
Status: DISCONTINUED | OUTPATIENT
Start: 2017-10-31 | End: 2017-10-31 | Stop reason: SDUPTHER

## 2017-10-31 RX ORDER — SODIUM CHLORIDE 9 MG/ML
INJECTION, SOLUTION INTRAVENOUS CONTINUOUS
Status: DISCONTINUED | OUTPATIENT
Start: 2017-10-31 | End: 2017-11-02

## 2017-10-31 RX ORDER — SODIUM CHLORIDE, SODIUM LACTATE, POTASSIUM CHLORIDE, CALCIUM CHLORIDE 600; 310; 30; 20 MG/100ML; MG/100ML; MG/100ML; MG/100ML
INJECTION, SOLUTION INTRAVENOUS CONTINUOUS PRN
Status: DISCONTINUED | OUTPATIENT
Start: 2017-10-31 | End: 2017-10-31 | Stop reason: SDUPTHER

## 2017-10-31 RX ORDER — CLOPIDOGREL BISULFATE 75 MG/1
75 TABLET ORAL DAILY
Status: DISCONTINUED | OUTPATIENT
Start: 2017-11-01 | End: 2017-11-04 | Stop reason: HOSPADM

## 2017-10-31 RX ORDER — OXYCODONE HYDROCHLORIDE 5 MG/1
5 TABLET ORAL EVERY 6 HOURS PRN
Status: DISCONTINUED | OUTPATIENT
Start: 2017-10-31 | End: 2017-11-01

## 2017-10-31 RX ORDER — ALBUMIN, HUMAN INJ 5% 5 %
SOLUTION INTRAVENOUS
Status: COMPLETED
Start: 2017-10-31 | End: 2017-10-31

## 2017-10-31 RX ORDER — DEXTROSE MONOHYDRATE 25 G/50ML
12.5 INJECTION, SOLUTION INTRAVENOUS PRN
Status: DISCONTINUED | OUTPATIENT
Start: 2017-10-31 | End: 2017-11-04 | Stop reason: HOSPADM

## 2017-10-31 RX ORDER — METOCLOPRAMIDE HYDROCHLORIDE 5 MG/ML
10 INJECTION INTRAMUSCULAR; INTRAVENOUS EVERY 6 HOURS PRN
Status: DISCONTINUED | OUTPATIENT
Start: 2017-10-31 | End: 2017-11-04 | Stop reason: HOSPADM

## 2017-10-31 RX ADMIN — ROCURONIUM BROMIDE 50 MG: 10 INJECTION INTRAVENOUS at 14:50

## 2017-10-31 RX ADMIN — SODIUM CHLORIDE: 900 INJECTION, SOLUTION INTRAVENOUS at 18:25

## 2017-10-31 RX ADMIN — Medication 10 ML: at 13:05

## 2017-10-31 RX ADMIN — Medication 15 ML: at 21:08

## 2017-10-31 RX ADMIN — ALBUMIN (HUMAN) 250 ML: 12.5 INJECTION, SOLUTION INTRAVENOUS at 19:10

## 2017-10-31 RX ADMIN — PHENYLEPHRINE HYDROCHLORIDE 200 MCG: 10 INJECTION INTRAVENOUS at 16:40

## 2017-10-31 RX ADMIN — PROTAMINE SULFATE 200 MG: 10 INJECTION, SOLUTION INTRAVENOUS at 18:05

## 2017-10-31 RX ADMIN — CEFAZOLIN 2 G: 1 INJECTION, POWDER, FOR SOLUTION INTRAMUSCULAR; INTRAVENOUS at 20:20

## 2017-10-31 RX ADMIN — SODIUM CHLORIDE, POTASSIUM CHLORIDE, SODIUM LACTATE AND CALCIUM CHLORIDE: 600; 310; 30; 20 INJECTION, SOLUTION INTRAVENOUS at 13:50

## 2017-10-31 RX ADMIN — Medication 0.03 MCG/KG/MIN: at 14:29

## 2017-10-31 RX ADMIN — Medication 15 ML: at 13:04

## 2017-10-31 RX ADMIN — FENTANYL CITRATE 250 MCG: 50 INJECTION INTRAMUSCULAR; INTRAVENOUS at 13:58

## 2017-10-31 RX ADMIN — PHENYLEPHRINE HYDROCHLORIDE 200 MCG: 10 INJECTION INTRAVENOUS at 17:15

## 2017-10-31 RX ADMIN — POTASSIUM CHLORIDE 20 MEQ: 400 INJECTION, SOLUTION INTRAVENOUS at 23:15

## 2017-10-31 RX ADMIN — MIDAZOLAM HYDROCHLORIDE 2 MG: 1 INJECTION, SOLUTION INTRAMUSCULAR; INTRAVENOUS at 13:38

## 2017-10-31 RX ADMIN — PHENYLEPHRINE HYDROCHLORIDE 200 MCG: 10 INJECTION INTRAVENOUS at 18:55

## 2017-10-31 RX ADMIN — SODIUM CHLORIDE: 9 INJECTION, SOLUTION INTRAVENOUS at 08:00

## 2017-10-31 RX ADMIN — ALBUMIN (HUMAN) 250 ML: 12.5 INJECTION, SOLUTION INTRAVENOUS at 16:41

## 2017-10-31 RX ADMIN — PHENYLEPHRINE HYDROCHLORIDE 200 MCG: 10 INJECTION INTRAVENOUS at 18:25

## 2017-10-31 RX ADMIN — FENTANYL CITRATE 250 MCG: 50 INJECTION INTRAMUSCULAR; INTRAVENOUS at 14:50

## 2017-10-31 RX ADMIN — PHENYLEPHRINE HYDROCHLORIDE 100 MCG: 10 INJECTION INTRAVENOUS at 18:00

## 2017-10-31 RX ADMIN — Medication 10 MCG: at 18:15

## 2017-10-31 RX ADMIN — PHENYLEPHRINE HYDROCHLORIDE 100 MCG: 10 INJECTION INTRAVENOUS at 15:35

## 2017-10-31 RX ADMIN — SODIUM CHLORIDE 2 UNITS: 9 INJECTION, SOLUTION INTRAVENOUS at 15:28

## 2017-10-31 RX ADMIN — PHENYLEPHRINE HYDROCHLORIDE 200 MCG: 10 INJECTION INTRAVENOUS at 14:01

## 2017-10-31 RX ADMIN — MIDAZOLAM HYDROCHLORIDE 2 MG: 1 INJECTION, SOLUTION INTRAMUSCULAR; INTRAVENOUS at 18:40

## 2017-10-31 RX ADMIN — PROPOFOL 25 MCG/KG/MIN: 10 INJECTION, EMULSION INTRAVENOUS at 17:51

## 2017-10-31 RX ADMIN — SODIUM CHLORIDE, POTASSIUM CHLORIDE, SODIUM LACTATE AND CALCIUM CHLORIDE: 600; 310; 30; 20 INJECTION, SOLUTION INTRAVENOUS at 14:23

## 2017-10-31 RX ADMIN — PHENYLEPHRINE HYDROCHLORIDE 200 MCG: 10 INJECTION INTRAVENOUS at 16:45

## 2017-10-31 RX ADMIN — ALBUMIN (HUMAN) 25 G: 12.5 INJECTION, SOLUTION INTRAVENOUS at 20:09

## 2017-10-31 RX ADMIN — FAMOTIDINE 20 MG: 10 INJECTION, SOLUTION INTRAVENOUS at 21:08

## 2017-10-31 RX ADMIN — SODIUM CHLORIDE: 900 INJECTION, SOLUTION INTRAVENOUS at 17:24

## 2017-10-31 RX ADMIN — SODIUM CHLORIDE, POTASSIUM CHLORIDE, SODIUM LACTATE AND CALCIUM CHLORIDE: 600; 310; 30; 20 INJECTION, SOLUTION INTRAVENOUS at 15:35

## 2017-10-31 RX ADMIN — ROCURONIUM BROMIDE 50 MG: 10 INJECTION INTRAVENOUS at 13:45

## 2017-10-31 RX ADMIN — PHENYLEPHRINE HYDROCHLORIDE 200 MCG: 10 INJECTION INTRAVENOUS at 17:20

## 2017-10-31 RX ADMIN — ALBUMIN (HUMAN) 250 ML: 12.5 INJECTION, SOLUTION INTRAVENOUS at 18:05

## 2017-10-31 RX ADMIN — SODIUM CHLORIDE, POTASSIUM CHLORIDE, SODIUM LACTATE AND CALCIUM CHLORIDE: 600; 310; 30; 20 INJECTION, SOLUTION INTRAVENOUS at 13:36

## 2017-10-31 RX ADMIN — SODIUM CHLORIDE, POTASSIUM CHLORIDE, SODIUM LACTATE AND CALCIUM CHLORIDE: 600; 310; 30; 20 INJECTION, SOLUTION INTRAVENOUS at 14:15

## 2017-10-31 RX ADMIN — CEFAZOLIN 0.03 MG: 1 INJECTION, POWDER, FOR SOLUTION INTRAMUSCULAR; INTRAVENOUS at 19:20

## 2017-10-31 RX ADMIN — MUPIROCIN: 20 OINTMENT TOPICAL at 13:38

## 2017-10-31 RX ADMIN — POTASSIUM CHLORIDE 20 MEQ: 400 INJECTION, SOLUTION INTRAVENOUS at 21:50

## 2017-10-31 RX ADMIN — LIDOCAINE HYDROCHLORIDE 50 MG: 10 INJECTION, SOLUTION EPIDURAL; INFILTRATION; INTRACAUDAL; PERINEURAL at 13:45

## 2017-10-31 RX ADMIN — PROPOFOL 25 MCG/KG/MIN: 10 INJECTION, EMULSION INTRAVENOUS at 20:08

## 2017-10-31 RX ADMIN — SODIUM CHLORIDE, POTASSIUM CHLORIDE, SODIUM LACTATE AND CALCIUM CHLORIDE: 600; 310; 30; 20 INJECTION, SOLUTION INTRAVENOUS at 18:55

## 2017-10-31 RX ADMIN — HEPARIN SODIUM 10000 UNITS: 1000 INJECTION, SOLUTION INTRAVENOUS; SUBCUTANEOUS at 18:25

## 2017-10-31 RX ADMIN — HEPARIN SODIUM 30000 UNITS: 1000 INJECTION, SOLUTION INTRAVENOUS; SUBCUTANEOUS at 15:13

## 2017-10-31 RX ADMIN — PROPOFOL 100 MG: 10 INJECTION, EMULSION INTRAVENOUS at 13:45

## 2017-10-31 RX ADMIN — CALCIUM CHLORIDE 0.5 G: 100 INJECTION, SOLUTION INTRAVENOUS; INTRAVENTRICULAR at 18:57

## 2017-10-31 RX ADMIN — SODIUM CHLORIDE, POTASSIUM CHLORIDE, SODIUM LACTATE AND CALCIUM CHLORIDE: 600; 310; 30; 20 INJECTION, SOLUTION INTRAVENOUS at 13:47

## 2017-10-31 RX ADMIN — SODIUM CHLORIDE, POTASSIUM CHLORIDE, SODIUM LACTATE AND CALCIUM CHLORIDE: 600; 310; 30; 20 INJECTION, SOLUTION INTRAVENOUS at 13:42

## 2017-10-31 RX ADMIN — CEFAZOLIN 2000 MG: 1 INJECTION, POWDER, FOR SOLUTION INTRAMUSCULAR; INTRAVENOUS at 17:35

## 2017-10-31 RX ADMIN — PHENYLEPHRINE HYDROCHLORIDE 200 MCG: 10 INJECTION INTRAVENOUS at 18:30

## 2017-10-31 RX ADMIN — PHENYLEPHRINE HYDROCHLORIDE 200 MCG: 10 INJECTION INTRAVENOUS at 14:15

## 2017-10-31 RX ADMIN — SODIUM CHLORIDE: 900 INJECTION, SOLUTION INTRAVENOUS at 17:50

## 2017-10-31 RX ADMIN — MIDAZOLAM HYDROCHLORIDE 2 MG: 1 INJECTION, SOLUTION INTRAMUSCULAR; INTRAVENOUS at 18:50

## 2017-10-31 RX ADMIN — FENTANYL CITRATE 250 MCG: 50 INJECTION INTRAMUSCULAR; INTRAVENOUS at 14:20

## 2017-10-31 RX ADMIN — HEPARIN SODIUM 10000 UNITS: 1000 INJECTION, SOLUTION INTRAVENOUS; SUBCUTANEOUS at 18:30

## 2017-10-31 RX ADMIN — PHENYLEPHRINE HYDROCHLORIDE 200 MCG: 10 INJECTION INTRAVENOUS at 18:05

## 2017-10-31 RX ADMIN — PHENYLEPHRINE HYDROCHLORIDE 200 MCG: 10 INJECTION INTRAVENOUS at 14:25

## 2017-10-31 RX ADMIN — FENTANYL CITRATE 50 MCG: 50 INJECTION INTRAMUSCULAR; INTRAVENOUS at 21:07

## 2017-10-31 RX ADMIN — FENTANYL CITRATE 250 MCG: 50 INJECTION INTRAMUSCULAR; INTRAVENOUS at 14:40

## 2017-10-31 RX ADMIN — CEFAZOLIN 2000 MG: 1 INJECTION, POWDER, FOR SOLUTION INTRAMUSCULAR; INTRAVENOUS at 14:44

## 2017-10-31 RX ADMIN — MIDAZOLAM HYDROCHLORIDE 2 MG: 1 INJECTION, SOLUTION INTRAMUSCULAR; INTRAVENOUS at 17:50

## 2017-10-31 RX ADMIN — PHENYLEPHRINE HYDROCHLORIDE 200 MCG: 10 INJECTION INTRAVENOUS at 15:10

## 2017-10-31 RX ADMIN — HEPARIN SODIUM 3000 UNITS: 1000 INJECTION, SOLUTION INTRAVENOUS; SUBCUTANEOUS at 17:25

## 2017-10-31 RX ADMIN — METOPROLOL TARTRATE 12.5 MG: 25 TABLET ORAL at 13:04

## 2017-10-31 RX ADMIN — PHENYLEPHRINE HYDROCHLORIDE 100 MCG: 10 INJECTION INTRAVENOUS at 16:35

## 2017-10-31 RX ADMIN — Medication 10 MCG: at 18:21

## 2017-10-31 RX ADMIN — PHENYLEPHRINE HYDROCHLORIDE 100 MCG: 10 INJECTION INTRAVENOUS at 15:30

## 2017-10-31 ASSESSMENT — PAIN SCALES - GENERAL
PAINLEVEL_OUTOF10: 0

## 2017-10-31 ASSESSMENT — PULMONARY FUNCTION TESTS
PIF_VALUE: 25
PIF_VALUE: 26

## 2017-10-31 NOTE — PLAN OF CARE
Problem: Falls - Risk of:  Goal: Will remain free from falls  Will remain free from falls   Outcome: Met This Shift  Patient to remain free from falls. Call light within reach. Problem: BLEEDING PRECAUTIONS  Goal: Knowledge of bleeding precautions  Outcome: Met This Shift  Patient on heparin gtt. Educated on risk for bleed d/t heparin gtt. No s/s of bleeding noted.

## 2017-10-31 NOTE — ANESTHESIA PROCEDURE NOTES
Procedure Performed: ALIA     Start Time:        End Time:                    Department of Anesthesiology  ALIA Report         Name:  Betty Field                                         Age:  76 y.o. MRN:  [id number]           Procedure (Scheduled):  ALIA  Requested by Surgeon: Jose G Davila  Performed by Dr. Abel Silverman: Transesophageal Echo    Today's Date: 10/31/2017    Patient seen and examined. History and Physical reviewed. Labs reviewed. ALIA:    Structures:    LA: Normal  RA: Normal  RV: Normal size and function  LV: Normal size and function. Estimated LVEF 55 %  LV apex: No LV apical thrombus identified  Aorta: Mild atheromatous disease Asc Aorta, arch and descending Aorta  Percardium: No pericardial effusion  MARANDA: No appendage thrombus identified  Septum: No intracardiac shunt via color Doppler. Valves:    Mitral Valve: Structurally normal. mild central regurgitation is identified. Aortic Valve: The aortic valve is trileaflet and opens adequately. none stenosis is identified. none regurgitiation is identified. Tricuspid valve: Structurally normal. none regurgitation is identified. Pulmonary valve: Normal. No significant regurgitation  No valvular vegetations or thrombus identified. Summary:     1. A ALIA was performed without complications. 2. LVEF 55 % preop. 3. Pre-op Valvular abnormalities mild central MR  4. No Aortic dissection  5. Significant findings were communicated to CTS.     Electronically signed by Liliana Scott MD on 10/31/2017 at 2:25 PM

## 2017-10-31 NOTE — ANESTHESIA PROCEDURE NOTES
Central Venous Line:    A central venous line was placed using surface landmarks, in the OR for the following indication(s): central venous access. Sterility preparation included the following: hand hygiene performed prior to procedure, maximum sterile barriers used and sterile technique used to drape from head to toe. The patient was placed in Trendelenburg position. The left subclavian vein was prepped. The site was prepped with Betadine. A 7.5 Fr (size), 16 (length), double lumen was placed. During the procedure, the following specific steps were taken: target vein identified, needle advanced into vein and blood aspirated and guidewire advanced into vein. Intravenous verification was obtained by venous blood return. Post insertion care included: all ports aspirated, all ports flushed easily, guidewire removed intact, Biopatch applied, line sutured in place and dressing applied. During the procedure the patient experienced: patient tolerated procedure well with no complications.       Anesthesia type: general  Staffing  Anesthesiologist: France Coronado  Performed: Anesthesiologist   Preanesthetic Checklist  Completed: patient identified, site marked, surgical consent, pre-op evaluation, timeout performed, IV checked, risks and benefits discussed, monitors and equipment checked, anesthesia consent given, oxygen available and patient being monitored

## 2017-10-31 NOTE — ANESTHESIA PROCEDURE NOTES
Central Venous Line:    A central venous line was placed using surface landmarks, in the OR for the following indication(s):     Sterility preparation included the following: hand hygiene performed prior to procedure, maximum sterile barriers used and sterile technique used to drape from head to toe. The patient was placed in Trendelenburg position. The right internal jugular vein was prepped. The site was prepped with Betadine. A 9 Fr (size), 10 (length), introducer     During the procedure, the following specific steps were taken: target vein identified, needle advanced into vein and blood aspirated and guidewire advanced into vein. Intravenous verification was obtained by venous blood return. Post insertion care included: all ports aspirated, all ports flushed easily, guidewire removed intact, Biopatch applied, line sutured in place and dressing applied. During the procedure the patient experienced: patient tolerated procedure well with no complications. Anesthesia type: generalA(n) oximetric, 7 (size) Pulmonary Artery Catheter (PAC) was placed through the Introducer CVL in the  internal jugular vein. The PAC placement was confirmed by pressure tracing changes. The patient experienced the following events during the procedure: patient tolerated procedure well with no complications. PA Cath placed?: Yes  Staffing  Anesthesiologist: Yamileth Coello  Performed: Anesthesiologist   Preanesthetic Checklist  Completed: patient identified, site marked, surgical consent, pre-op evaluation, timeout performed, IV checked, risks and benefits discussed, monitors and equipment checked, anesthesia consent given, oxygen available and patient being monitored

## 2017-11-01 ENCOUNTER — APPOINTMENT (OUTPATIENT)
Dept: GENERAL RADIOLOGY | Age: 77
DRG: 234 | End: 2017-11-01
Attending: INTERNAL MEDICINE
Payer: MEDICARE

## 2017-11-01 LAB
ABO/RH: NORMAL
ALLEN TEST: POSITIVE
ANION GAP SERPL CALCULATED.3IONS-SCNC: 12 MMOL/L (ref 9–17)
ANTIBODY SCREEN: NEGATIVE
ARM BAND NUMBER: NORMAL
BLD PROD TYP BPU: NORMAL
BLD PROD TYP BPU: NORMAL
BUN BLDV-MCNC: 14 MG/DL (ref 8–23)
BUN/CREAT BLD: ABNORMAL (ref 9–20)
CALCIUM SERPL-MCNC: 7.9 MG/DL (ref 8.6–10.4)
CHLORIDE BLD-SCNC: 107 MMOL/L (ref 98–107)
CO2: 20 MMOL/L (ref 20–31)
CREAT SERPL-MCNC: 0.63 MG/DL (ref 0.7–1.2)
CROSSMATCH RESULT: NORMAL
CROSSMATCH RESULT: NORMAL
CULTURE: NO GROWTH
CULTURE: NORMAL
DISPENSE STATUS BLOOD BANK: NORMAL
DISPENSE STATUS BLOOD BANK: NORMAL
EKG ATRIAL RATE: 64 BPM
EKG P AXIS: 39 DEGREES
EKG P-R INTERVAL: 314 MS
EKG Q-T INTERVAL: 430 MS
EKG QRS DURATION: 80 MS
EKG QTC CALCULATION (BAZETT): 443 MS
EKG R AXIS: 34 DEGREES
EKG T AXIS: 49 DEGREES
EKG VENTRICULAR RATE: 64 BPM
EXPIRATION DATE: NORMAL
FIO2: 40
GFR AFRICAN AMERICAN: >60 ML/MIN
GFR NON-AFRICAN AMERICAN: >60 ML/MIN
GFR SERPL CREATININE-BSD FRML MDRD: ABNORMAL ML/MIN/{1.73_M2}
GFR SERPL CREATININE-BSD FRML MDRD: ABNORMAL ML/MIN/{1.73_M2}
GLUCOSE BLD-MCNC: 102 MG/DL (ref 75–110)
GLUCOSE BLD-MCNC: 106 MG/DL (ref 75–110)
GLUCOSE BLD-MCNC: 109 MG/DL (ref 75–110)
GLUCOSE BLD-MCNC: 109 MG/DL (ref 75–110)
GLUCOSE BLD-MCNC: 121 MG/DL (ref 75–110)
GLUCOSE BLD-MCNC: 134 MG/DL (ref 75–110)
GLUCOSE BLD-MCNC: 146 MG/DL (ref 75–110)
GLUCOSE BLD-MCNC: 157 MG/DL (ref 75–110)
GLUCOSE BLD-MCNC: 79 MG/DL (ref 75–110)
GLUCOSE BLD-MCNC: 93 MG/DL (ref 70–99)
GLUCOSE BLD-MCNC: 93 MG/DL (ref 74–100)
GLUCOSE BLD-MCNC: 94 MG/DL (ref 75–110)
GLUCOSE BLD-MCNC: 97 MG/DL (ref 75–110)
HCT VFR BLD CALC: 28.1 % (ref 40.7–50.3)
HCT VFR BLD CALC: 30.8 % (ref 40.7–50.3)
HEMOGLOBIN: 9 G/DL (ref 13–17)
HEMOGLOBIN: 9.8 G/DL (ref 13–17)
Lab: NORMAL
MAGNESIUM: 1.5 MG/DL (ref 1.6–2.6)
MAGNESIUM: 1.5 MG/DL (ref 1.6–2.6)
MCH RBC QN AUTO: 28.3 PG (ref 25.2–33.5)
MCH RBC QN AUTO: 28.3 PG (ref 25.2–33.5)
MCHC RBC AUTO-ENTMCNC: 31.8 G/DL (ref 29.9–34.7)
MCHC RBC AUTO-ENTMCNC: 32 G/DL (ref 29.9–34.7)
MCV RBC AUTO: 88.4 FL (ref 82.6–102.9)
MCV RBC AUTO: 89 FL (ref 82.6–102.9)
MODE: ABNORMAL
NEGATIVE BASE EXCESS, ART: 3 (ref 0–2)
O2 DEVICE/FLOW/%: ABNORMAL
PATIENT TEMP: ABNORMAL
PDW BLD-RTO: 15.6 % (ref 11.8–14.4)
PDW BLD-RTO: 15.6 % (ref 11.8–14.4)
PLATELET # BLD: 155 K/UL (ref 138–453)
PLATELET # BLD: 177 K/UL (ref 138–453)
PMV BLD AUTO: 10.3 FL (ref 8.1–13.5)
PMV BLD AUTO: 9.9 FL (ref 8.1–13.5)
POC HCO3: 21.9 MMOL/L (ref 21–28)
POC O2 SATURATION: 98 % (ref 94–98)
POC PCO2 TEMP: ABNORMAL MM HG
POC PCO2: 38 MM HG (ref 35–48)
POC PH TEMP: ABNORMAL
POC PH: 7.37 (ref 7.35–7.45)
POC PO2 TEMP: ABNORMAL MM HG
POC PO2: 116.9 MM HG (ref 83–108)
POSITIVE BASE EXCESS, ART: ABNORMAL (ref 0–3)
POTASSIUM SERPL-SCNC: 4.1 MMOL/L (ref 3.7–5.3)
POTASSIUM SERPL-SCNC: 4.5 MMOL/L (ref 3.7–5.3)
RBC # BLD: 3.18 M/UL (ref 4.21–5.77)
RBC # BLD: 3.46 M/UL (ref 4.21–5.77)
SAMPLE SITE: ABNORMAL
SODIUM BLD-SCNC: 139 MMOL/L (ref 135–144)
SPECIMEN DESCRIPTION: NORMAL
STATUS: NORMAL
TCO2 (CALC), ART: 23 MMOL/L (ref 22–29)
TRANSFUSION STATUS: NORMAL
TRANSFUSION STATUS: NORMAL
UNIT DIVISION: 0
UNIT DIVISION: 0
UNIT NUMBER: NORMAL
UNIT NUMBER: NORMAL
WBC # BLD: 11.2 K/UL (ref 3.5–11.3)
WBC # BLD: 21.2 K/UL (ref 3.5–11.3)

## 2017-11-01 PROCEDURE — 85027 COMPLETE CBC AUTOMATED: CPT

## 2017-11-01 PROCEDURE — G8978 MOBILITY CURRENT STATUS: HCPCS

## 2017-11-01 PROCEDURE — 97162 PT EVAL MOD COMPLEX 30 MIN: CPT

## 2017-11-01 PROCEDURE — 82803 BLOOD GASES ANY COMBINATION: CPT

## 2017-11-01 PROCEDURE — G8979 MOBILITY GOAL STATUS: HCPCS

## 2017-11-01 PROCEDURE — 2580000003 HC RX 258: Performed by: THORACIC SURGERY (CARDIOTHORACIC VASCULAR SURGERY)

## 2017-11-01 PROCEDURE — S0028 INJECTION, FAMOTIDINE, 20 MG: HCPCS | Performed by: THORACIC SURGERY (CARDIOTHORACIC VASCULAR SURGERY)

## 2017-11-01 PROCEDURE — 97530 THERAPEUTIC ACTIVITIES: CPT

## 2017-11-01 PROCEDURE — 6370000000 HC RX 637 (ALT 250 FOR IP): Performed by: THORACIC SURGERY (CARDIOTHORACIC VASCULAR SURGERY)

## 2017-11-01 PROCEDURE — 83735 ASSAY OF MAGNESIUM: CPT

## 2017-11-01 PROCEDURE — G8988 SELF CARE GOAL STATUS: HCPCS

## 2017-11-01 PROCEDURE — 6360000002 HC RX W HCPCS: Performed by: THORACIC SURGERY (CARDIOTHORACIC VASCULAR SURGERY)

## 2017-11-01 PROCEDURE — 97535 SELF CARE MNGMENT TRAINING: CPT

## 2017-11-01 PROCEDURE — 2140000001 HC CVICU INTERMEDIATE R&B

## 2017-11-01 PROCEDURE — 6360000002 HC RX W HCPCS: Performed by: NURSE PRACTITIONER

## 2017-11-01 PROCEDURE — 84132 ASSAY OF SERUM POTASSIUM: CPT

## 2017-11-01 PROCEDURE — 99024 POSTOP FOLLOW-UP VISIT: CPT | Performed by: NURSE PRACTITIONER

## 2017-11-01 PROCEDURE — G8987 SELF CARE CURRENT STATUS: HCPCS

## 2017-11-01 PROCEDURE — 71010 XR CHEST PORTABLE: CPT

## 2017-11-01 PROCEDURE — 2500000003 HC RX 250 WO HCPCS: Performed by: THORACIC SURGERY (CARDIOTHORACIC VASCULAR SURGERY)

## 2017-11-01 PROCEDURE — 6370000000 HC RX 637 (ALT 250 FOR IP): Performed by: NURSE PRACTITIONER

## 2017-11-01 PROCEDURE — 80048 BASIC METABOLIC PNL TOTAL CA: CPT

## 2017-11-01 PROCEDURE — 94762 N-INVAS EAR/PLS OXIMTRY CONT: CPT

## 2017-11-01 PROCEDURE — 82947 ASSAY GLUCOSE BLOOD QUANT: CPT

## 2017-11-01 PROCEDURE — 97116 GAIT TRAINING THERAPY: CPT

## 2017-11-01 PROCEDURE — 97166 OT EVAL MOD COMPLEX 45 MIN: CPT

## 2017-11-01 RX ORDER — CELECOXIB 100 MG/1
100 CAPSULE ORAL 2 TIMES DAILY
Status: DISCONTINUED | OUTPATIENT
Start: 2017-11-01 | End: 2017-11-01

## 2017-11-01 RX ORDER — PANTOPRAZOLE SODIUM 40 MG/1
40 TABLET, DELAYED RELEASE ORAL
Status: DISCONTINUED | OUTPATIENT
Start: 2017-11-02 | End: 2017-11-04 | Stop reason: HOSPADM

## 2017-11-01 RX ORDER — DOCUSATE SODIUM 100 MG/1
100 CAPSULE, LIQUID FILLED ORAL 2 TIMES DAILY
Status: DISCONTINUED | OUTPATIENT
Start: 2017-11-01 | End: 2017-11-04 | Stop reason: HOSPADM

## 2017-11-01 RX ORDER — OXYCODONE HYDROCHLORIDE 5 MG/1
10 TABLET ORAL EVERY 4 HOURS PRN
Status: DISCONTINUED | OUTPATIENT
Start: 2017-11-01 | End: 2017-11-04 | Stop reason: HOSPADM

## 2017-11-01 RX ORDER — ACETAMINOPHEN 10 MG/ML
1000 INJECTION, SOLUTION INTRAVENOUS EVERY 8 HOURS PRN
Status: DISCONTINUED | OUTPATIENT
Start: 2017-11-01 | End: 2017-11-04 | Stop reason: HOSPADM

## 2017-11-01 RX ORDER — OXYCODONE HYDROCHLORIDE 5 MG/1
5 TABLET ORAL EVERY 4 HOURS PRN
Status: DISCONTINUED | OUTPATIENT
Start: 2017-11-01 | End: 2017-11-04 | Stop reason: HOSPADM

## 2017-11-01 RX ORDER — CELECOXIB 100 MG/1
100 CAPSULE ORAL 2 TIMES DAILY
Status: DISCONTINUED | OUTPATIENT
Start: 2017-11-01 | End: 2017-11-03

## 2017-11-01 RX ORDER — NICOTINE POLACRILEX 4 MG
15 LOZENGE BUCCAL PRN
Status: DISCONTINUED | OUTPATIENT
Start: 2017-11-01 | End: 2017-11-04 | Stop reason: HOSPADM

## 2017-11-01 RX ORDER — DEXTROSE MONOHYDRATE 25 G/50ML
12.5 INJECTION, SOLUTION INTRAVENOUS PRN
Status: DISCONTINUED | OUTPATIENT
Start: 2017-11-01 | End: 2017-11-04 | Stop reason: HOSPADM

## 2017-11-01 RX ORDER — FUROSEMIDE 10 MG/ML
40 INJECTION INTRAMUSCULAR; INTRAVENOUS ONCE
Status: COMPLETED | OUTPATIENT
Start: 2017-11-01 | End: 2017-11-01

## 2017-11-01 RX ORDER — BISACODYL 10 MG
10 SUPPOSITORY, RECTAL RECTAL DAILY PRN
Status: DISCONTINUED | OUTPATIENT
Start: 2017-11-01 | End: 2017-11-04 | Stop reason: HOSPADM

## 2017-11-01 RX ORDER — POLYETHYLENE GLYCOL 3350 17 G/17G
17 POWDER, FOR SOLUTION ORAL DAILY
Status: DISCONTINUED | OUTPATIENT
Start: 2017-11-01 | End: 2017-11-04 | Stop reason: HOSPADM

## 2017-11-01 RX ORDER — DEXTROSE MONOHYDRATE 50 MG/ML
100 INJECTION, SOLUTION INTRAVENOUS PRN
Status: DISCONTINUED | OUTPATIENT
Start: 2017-11-01 | End: 2017-11-04 | Stop reason: HOSPADM

## 2017-11-01 RX ADMIN — POLYETHYLENE GLYCOL 3350 17 G: 17 POWDER, FOR SOLUTION ORAL at 10:02

## 2017-11-01 RX ADMIN — CEFAZOLIN 2 G: 1 INJECTION, POWDER, FOR SOLUTION INTRAMUSCULAR; INTRAVENOUS at 12:00

## 2017-11-01 RX ADMIN — DOCUSATE SODIUM 100 MG: 100 CAPSULE, LIQUID FILLED ORAL at 20:59

## 2017-11-01 RX ADMIN — OXYCODONE HYDROCHLORIDE 5 MG: 5 TABLET ORAL at 12:07

## 2017-11-01 RX ADMIN — OXYCODONE HYDROCHLORIDE 5 MG: 5 TABLET ORAL at 03:59

## 2017-11-01 RX ADMIN — AMIODARONE HYDROCHLORIDE 200 MG: 200 TABLET ORAL at 10:00

## 2017-11-01 RX ADMIN — ASPIRIN 81 MG: 81 TABLET, COATED ORAL at 10:00

## 2017-11-01 RX ADMIN — Medication 15 ML: at 10:00

## 2017-11-01 RX ADMIN — MULTIPLE VITAMINS W/ MINERALS TAB 1 TABLET: TAB at 10:00

## 2017-11-01 RX ADMIN — OXYCODONE HYDROCHLORIDE 10 MG: 5 TABLET ORAL at 20:59

## 2017-11-01 RX ADMIN — FUROSEMIDE 40 MG: 10 INJECTION, SOLUTION INTRAMUSCULAR; INTRAVENOUS at 09:35

## 2017-11-01 RX ADMIN — MAGNESIUM SULFATE HEPTAHYDRATE 1 G: 1 INJECTION, SOLUTION INTRAVENOUS at 08:09

## 2017-11-01 RX ADMIN — FAMOTIDINE 20 MG: 10 INJECTION, SOLUTION INTRAVENOUS at 09:40

## 2017-11-01 RX ADMIN — AMIODARONE HYDROCHLORIDE 200 MG: 200 TABLET ORAL at 13:57

## 2017-11-01 RX ADMIN — ACETAMINOPHEN 1000 MG: 10 INJECTION, SOLUTION INTRAVENOUS at 08:16

## 2017-11-01 RX ADMIN — DOCUSATE SODIUM 100 MG: 100 CAPSULE, LIQUID FILLED ORAL at 10:00

## 2017-11-01 RX ADMIN — CLOPIDOGREL 75 MG: 75 TABLET, FILM COATED ORAL at 10:00

## 2017-11-01 RX ADMIN — SIMVASTATIN 20 MG: 20 TABLET, FILM COATED ORAL at 20:59

## 2017-11-01 RX ADMIN — FENTANYL CITRATE 50 MCG: 50 INJECTION INTRAMUSCULAR; INTRAVENOUS at 00:45

## 2017-11-01 RX ADMIN — OXYCODONE HYDROCHLORIDE 10 MG: 5 TABLET ORAL at 16:27

## 2017-11-01 RX ADMIN — MAGNESIUM SULFATE HEPTAHYDRATE 1 G: 1 INJECTION, SOLUTION INTRAVENOUS at 07:08

## 2017-11-01 RX ADMIN — CELECOXIB 100 MG: 100 CAPSULE ORAL at 10:04

## 2017-11-01 RX ADMIN — OXYCODONE HYDROCHLORIDE 5 MG: 5 TABLET ORAL at 10:01

## 2017-11-01 RX ADMIN — ENOXAPARIN SODIUM 40 MG: 40 INJECTION SUBCUTANEOUS at 10:00

## 2017-11-01 RX ADMIN — ACETAMINOPHEN 1000 MG: 10 INJECTION, SOLUTION INTRAVENOUS at 16:54

## 2017-11-01 RX ADMIN — Medication 15 ML: at 21:02

## 2017-11-01 RX ADMIN — CEFAZOLIN 2 G: 1 INJECTION, POWDER, FOR SOLUTION INTRAMUSCULAR; INTRAVENOUS at 04:40

## 2017-11-01 ASSESSMENT — PULMONARY FUNCTION TESTS: PIF_VALUE: 33

## 2017-11-01 ASSESSMENT — PAIN SCALES - GENERAL
PAINLEVEL_OUTOF10: 5
PAINLEVEL_OUTOF10: 4
PAINLEVEL_OUTOF10: 8
PAINLEVEL_OUTOF10: 6
PAINLEVEL_OUTOF10: 8
PAINLEVEL_OUTOF10: 6
PAINLEVEL_OUTOF10: 6
PAINLEVEL_OUTOF10: 2
PAINLEVEL_OUTOF10: 8
PAINLEVEL_OUTOF10: 2
PAINLEVEL_OUTOF10: 6
PAINLEVEL_OUTOF10: 4

## 2017-11-01 ASSESSMENT — PAIN DESCRIPTION - PAIN TYPE
TYPE: ACUTE PAIN;SURGICAL PAIN
TYPE: ACUTE PAIN
TYPE: SURGICAL PAIN
TYPE: ACUTE PAIN;SURGICAL PAIN

## 2017-11-01 ASSESSMENT — PAIN DESCRIPTION - LOCATION
LOCATION: INCISION
LOCATION: INCISION;CHEST
LOCATION: STERNUM
LOCATION: CHEST;INCISION

## 2017-11-01 NOTE — PROGRESS NOTES
[facility]   Progress Note                 Date:   11/1/2017  Patient name:  Addi Coronado  Date of admission:  10/28/2017 10:42 PM  MRN:   [id number]  YOB: 1940  PCP:    Vishal Corea MD    Reason for Admission: NSTEMI      Subjective:       Clinical Changes / Abnormalities:     Patient seen and examined. S/P CABG yesterday. Feeling \"lousy\", but doing well clinically. D/W RN. No issues overnight. Medications:   Scheduled Meds:    docusate sodium  100 mg Oral BID    polyethylene glycol  17 g Oral Daily    enoxaparin  40 mg Subcutaneous Daily    celecoxib  100 mg Oral BID    amiodarone  200 mg Oral TID    chlorhexidine  15 mL Mouth/Throat BID    therapeutic multivitamin-minerals  1 tablet Oral Daily with breakfast    simvastatin  20 mg Oral Nightly    aspirin  81 mg Oral Daily    aspirin  150 mg Rectal Once    clopidogrel  75 mg Oral Daily    ceFAZolin (ANCEF) IVPB  2 g Intravenous Q8H    famotidine (PEPCID) injection  20 mg Intravenous BID    insulin lispro  0-6 Units Subcutaneous TID WC    insulin lispro  0-3 Units Subcutaneous Nightly     Continuous Infusions:    insulin (HUMAN R) non-weight based infusion 0.33 Units/hr (11/01/17 0510)    dextrose      sodium chloride      dextrose       CBC:   Recent Labs      10/31/17   0341  10/31/17   2005  11/01/17   0522   WBC   --   21.2*  11.2   HGB   --   9.8*  9.0*   PLT  247  177  155     BMP:    Recent Labs      10/31/17   0341  10/31/17   2009  11/01/17   0126  11/01/17   0522   NA  137   --    --   139   K  4.1   --   4.5  4.1   CL  103   --    --   107   CO2  21   --    --   20   BUN  23   --    --   14   CREATININE  0.86  0.53   --   0.63*   GLUCOSE  89   --    --   93     Hepatic: No results for input(s): AST, ALT, ALB, BILITOT, ALKPHOS in the last 72 hours. Troponin: No results for input(s): TROPONINI in the last 72 hours. BNP: No results for input(s): BNP in the last 72 hours.   Lipids: No results surgery  5. Acute post-op respiratory insufficiency      Plan / Recommendations:   1. Continue post-op management per CT surgery  2. Chest tubes, pacer wires, and swan muriel management per CT surgery  3. Wean off pressors as able  4. On PO Amiodarone for arrhythmia prophylaxis  5. On ASA 81, Plavix 75, Zocor 20  6. Not on BB due to hypotension and bradycardia requiring pacing  7. DISCONTINUE CELECOXIB AS PATIENT IS HIGH RISK FOR GI BLEED - ON ASA, PLAVIX, CELECOXIB, LOVENOX  8. Agree with Pepcid PO for GI prophylaxis  9. Monitor labs      Electronically signed on 11/01/17 at 10:30 AM by:    Peter Flores MD   Fellow, [address]    Attending Cardiologist Addendum: I have reviewed and performed the history, physical, subjective, objective, assessment, and plan with the resident/fellow and agree with the note. I performed the history and physical personally. I have made changes to the note above as needed. Routine post op care per CT surgery   Some crackles on exam- agree with lasix  Will follow    Thank you for allowing me to participate in the care of this patient, please do not hesitate to call if you have any questions. Sofia Chou, [id number] [facility]  [facility]  [phone]

## 2017-11-01 NOTE — PROGRESS NOTES
Updated Dr. Gold Signs on patient chest tube output after patient up to chair. Ordered to d/c chest tubes. Updated on patient medications. Continue with Celebrex as previously ordered.

## 2017-11-01 NOTE — PROGRESS NOTES
Physical Therapy    Facility/Department: [facility]  Initial Assessment    NAME: Bay [place]  : 1940  MRN: [id number]  Pre-operative Diagnosis: Coronary Artery Disease     Post-operative Diagnosis: Same,      Findings: EF 50%     Procedure:  OP CABG X 6, LIMA to LAD and D in seq, SVG to  OM1and OM2 in seq, SVG to PDA and PLV in seq, EVH, Intra Op US Guidance  ALIA by anesthesia  Date of Service: 2017    Patient Diagnosis(es): There were no encounter diagnoses. has a past medical history of Rheumatoid arteritis. has a past surgical history that includes eye surgery; joint replacement (Right, 2017); and Coronary artery bypass graft (N/A, 10/31/2017). Restrictions  Restrictions/Precautions  Restrictions/Precautions: Cardiac, Fall Risk  Required Braces or Orthoses?: No  Position Activity Restriction  Sternal Precautions: No Pushing, No Pulling, 5# Lifting Restrictions  Other position/activity restrictions: Up to chair Day of surgery; CABGx6 10/31/17  Vision/Hearing  Vision: Within Functional Limits  Hearing: Within functional limits     Subjective  General  Patient assessed for rehabilitation services?: Yes  Response To Previous Treatment: Not applicable  Family / Caregiver Present: No  Follows Commands: Within Functional Limits  General Comment  Comments: OT co-eval  Subjective  Subjective: RN and pt agreeable to PT. Pt alert in bed upon arrival.  Pain Screening  Patient Currently in Pain: Yes  Pain Assessment  Pain Assessment: 0-10  Pain Level: 8  Pain Type: Acute pain;Surgical pain  Pain Location: Incision; Chest  Pain Intervention(s): Ambulation/Increased activity; Emotional support  Response to Pain Intervention: Patient Satisfied  Vital Signs  Patient Currently in Pain: Yes  Pre Treatment Pain Screening  Intervention List: Patient able to continue with treatment    Orientation  Orientation  Overall Orientation Status: Within Functional Limits    Social/Functional History  Social/Functional progress. Prognosis: Good  Decision Making: Medium Complexity  Patient Education: PT POC  REQUIRES PT FOLLOW UP: Yes  Activity Tolerance  Activity Tolerance: Patient Tolerated treatment well;Patient limited by pain  PT Equipment Recommendations  Equipment Needed: No     Discharge Recommendations:  Continue to assess pending progress, Home with assist PRN      Plan   Plan  Times per week: 7x/wk BID  Current Treatment Recommendations: Strengthening, ROM, Balance Training, Gait Training, Functional Mobility Training, Stair training, Endurance Training, Home Exercise Program, Safety Education & Training, Patient/Caregiver Education & Training, Equipment Evaluation, Education, & procurement  Safety Devices  Type of devices: Left in chair, Patient at risk for falls, Gait belt, Nurse notified, Call light within reach  Restraints  Initially in place: No    G-Code  PT G-Codes  Functional Assessment Tool Used: [place] tool  Score: 14  Functional Limitation: Mobility: Walking and moving around  Mobility: Walking and Moving Around Current Status (): At least 40 percent but less than 60 percent impaired, limited or restricted  Mobility: Walking and Moving Around Goal Status ():  At least 20 percent but less than 40 percent impaired, limited or restricted  OutComes Score                                           Goals  Short term goals  Time Frame for Short term goals: 20 visits  Short term goal 1: Pt will be I with bed mobility  Short term goal 2: Pt will be I with transfers  Short term goal 3: Pt will be I with amb 300' or Otto with least restrictive AD  Short term goal 4: Pt will navigate 4 steps L rail Otto       Therapy Time   Individual Concurrent Group Co-treatment   Time In 1126         Time Out 1154         Minutes [address] Anel Fagan, PT

## 2017-11-01 NOTE — PLAN OF CARE
Problem: Falls - Risk of:  Goal: Will remain free from falls  Will remain free from falls   Outcome: Ongoing      Problem: Risk for Impaired Skin Integrity  Goal: Tissue integrity - skin and mucous membranes  Structural intactness and normal physiological function of skin and  mucous membranes.    Outcome: Ongoing      Problem: Cardiac Output - Decreased:  Goal: Cardiac output within specified parameters  Cardiac output within specified parameters  Outcome: Ongoing      Problem: Fluid Volume - Imbalance:  Goal: Chest tube drainage is within specified parameters  Chest tube drainage is within specified parameters  Outcome: Ongoing      Problem: Pain:  Goal: Control of acute pain  Control of acute pain  Outcome: Ongoing

## 2017-11-01 NOTE — PROGRESS NOTES
access.)  Bathroom Toilet: Standard  Bathroom Equipment: Built-in shower seat, Grab bars in shower  Bathroom Accessibility: Accessible  Home Equipment: Cane, Standard walker, Rolling walker, [employer] (rollator)  Receives Help From: Family  ADL Assistance: Independent  Homemaking Assistance: Independent  Homemaking Responsibilities: Yes  Ambulation Assistance: Independent  Transfer Assistance: Independent  Active : Yes  Occupation: Retired  Leisure & Hobbies: [address] lawn, does not do weeding any more. Additional Comments: RIYA SCHULTZ may 2017. Objective   Vision: Within Functional Limits  Hearing: Within functional limits    Orientation  Overall Orientation Status: Within Functional Limits  Observation/Palpation  Posture: Good  Balance  Sitting Balance: Contact guard assistance (seated EOB )  Standing Balance: Contact guard assistance (use of RW)  Standing Balance  Time: 2 min  Activity: sit <> stand transfer, steps to chair  Sit to stand: Contact guard assistance  Stand to sit: Contact guard assistance  Comment: Pt demo no LOB or SOB  Functional Mobility  Functional - Mobility Device: Rolling Walker  Activity: Other (steps to chair)  Assist Level: Contact guard assistance  ADL  Feeding: Independent  Grooming: Contact guard assistance  UE Bathing: Minimal assistance  LE Bathing: Maximum assistance  UE Dressing: Minimal assistance (to adjust and tie gown)  LE Dressing: Maximum assistance (to don socks )  Toileting: Maximum assistance (catheter in place)  Additional Comments: Pt supine in bed on arrival. Pt assisted to don socks. Pt assisted to complete bed mobility and sit at EOB. Pt reported no dizziness or lightheadedness. Pt assisted to perform sit <> stand transfer with verbal cues for hand placement on RW. Pt assisted to take steps to chair. Pt educated in use of IS device and sternal precautions. Pt remained in chair, call light in reach and RN notified on therapist exit.    Tone RUE  RUE Tone:

## 2017-11-01 NOTE — PLAN OF CARE
Problem: Falls - Risk of:  Goal: Will remain free from falls  Will remain free from falls   Outcome: Ongoing    Goal: Absence of physical injury  Absence of physical injury   Outcome: Ongoing      Problem: Falls - Risk of  Goal: Absence of falls  Outcome: Ongoing      Problem: ASPIRATION PRECAUTIONS  Goal: Patient's risk of aspiration is minimized  Outcome: Ongoing      Problem: SKIN INTEGRITY  Goal: Skin integrity is maintained or improved  Outcome: Ongoing      Problem: Risk for Impaired Skin Integrity  Goal: Tissue integrity - skin and mucous membranes  Structural intactness and normal physiological function of skin and  mucous membranes.    Outcome: Ongoing      Problem: Cardiac Output - Decreased:  Goal: Cardiac output within specified parameters  Cardiac output within specified parameters   Outcome: Ongoing    Goal: Hemodynamic stability will improve  Hemodynamic stability will improve   Outcome: Ongoing      Problem: Fluid Volume - Imbalance:  Goal: Ability to achieve a balanced intake and output will improve  Ability to achieve a balanced intake and output will improve   Outcome: Ongoing    Goal: Chest tube drainage is within specified parameters  Chest tube drainage is within specified parameters   Outcome: Ongoing      Problem: Pain:  Goal: Pain level will decrease  Pain level will decrease   Outcome: Ongoing    Goal: Control of acute pain  Control of acute pain   Outcome: Ongoing    Goal: Control of chronic pain  Control of chronic pain   Outcome: Ongoing

## 2017-11-01 NOTE — PLAN OF CARE
Problem: OXYGENATION/RESPIRATORY FUNCTION  Goal: Patient will maintain patent airway  Outcome: Ongoing    Goal: Patient will achieve/maintain normal respiratory rate/effort  Respiratory rate and effort will be within normal limits for the patient  Outcome: Ongoing      Problem: MECHANICAL VENTILATION  Goal: Patient will maintain patent airway  Outcome: Ongoing    Goal: Oral health is maintained or improved  Outcome: Ongoing    Goal: Tracheostomy will be managed safely  Outcome: Ongoing    Goal: ET tube will be managed safely  Outcome: Ongoing    Goal: Ability to express needs and understand communication  Outcome: Ongoing    Goal: Mobility/activity is maintained at optimum level for patient  Outcome: Ongoing      Problem: ASPIRATION PRECAUTIONS  Goal: Patient's risk of aspiration is minimized  Outcome: Ongoing      Problem: SKIN INTEGRITY  Goal: Skin integrity is maintained or improved  Outcome: Ongoing

## 2017-11-01 NOTE — PROGRESS NOTES
[facility]  Progress Note    Surgeon: Dudley  Procedure:  CABG x 6  POD#: 1   EF: 50%      Subjective:  Mr. Rayna Xiao is drowsy, but alert and oriented x 3. He is having a lot of pain this morning, states he cannot take a deep breath. Resting in bed. All gtt off overnight and extubated early this morning. Plan of care reviewed and questions answered. Physical Exam  Vitals:  Vitals:    11/01/17 0700   BP:    Pulse: 76   Resp: 20   Temp:    SpO2: 100%     I/O last 3 completed shifts: In: 3831 [I.V.:5988; Blood:750]  Out: 9881 [Urine:2025; Blood:400; Chest Tube:340]  No intake/output data recorded. General: Alert and Oriented x3. Resting in bed. No apparent distress. HEENT:  Normocephalic and atraumatic. PERRL. EOMI. Lips and oral mucosa moist and without lesions. Neck:  Supple. Trachea midline. Chest:  No abnormality. Equal and symmetric expansion with respiration. Chest tubes to suction  Lungs:  Clear throughout, diminished bases  Cardiac:  Regular rate and rhythm without murmurs, rubs or gallops. Pacing wires  Yes  Abdomen:  Soft, non-tender, hypoactive bowel sounds. No masses or organomegaly. Extremities:  No edema. Intact pulses in all four extremities. Musculoskeletal:  Intact range of motion of peripheral joints. grossly normal  Neurologic:  Cranial nerves are grossly intact. Non-focal sensory deficits on exam.  Psychiatric: Mood and affect are appropriate. Surgical Wounds: Surgical incisions with clean, dry, intact dressings in place.        Current Medications:    Current Facility-Administered Medications:     insulin regular (HUMULIN R;NOVOLIN R) 100 Units in sodium chloride 0.9 % 100 mL infusion, 0.5 Units/hr, Intravenous, Continuous, Rainer Dunbar MD, Last Rate: 0.3 mL/hr at 11/01/17 0510, 0.33 Units/hr at 11/01/17 0510    glucose (GLUTOSE) 40 % oral gel 15 g, 15 g, Oral, PRN, Rainer Dunbar MD    dextrose 50 % solution 12.5 g, 12.5 g, Intravenous, PRN,

## 2017-11-01 NOTE — PROGRESS NOTES
Physical Therapy  Facility/Department: [facility] CAR 1  Daily Treatment Note  NAME: Louis Cuadra  : 1940  MRN: [id number]    Date of Service: 2017    Patient Diagnosis(es): There are no active problems to display for this patient. Past Medical History:   Diagnosis Date    Rheumatoid arteritis      Past Surgical History:   Procedure Laterality Date    CORONARY ARTERY BYPASS GRAFT N/A 10/31/2017    CORONARY ARTERY BYPASS GRAFT (OFF PUMP) X6: LIMA-LAD-DIAG, SVG-OM1-OM2, SVG-PDA-PLB; SWAN, ALIA PER ANESTHESIA performed by Lexie King MD at [address] [place] [address] Right 2017    hip reeplacement        Restrictions  Restrictions/Precautions  Restrictions/Precautions: Cardiac, Fall Risk  Required Braces or Orthoses?: No  Position Activity Restriction  Sternal Precautions: No Pushing, No Pulling, 5# Lifting Restrictions  Other position/activity restrictions: Up to chair Day of surgery; CABGx6 10/31/17  Subjective   General  Chart Reviewed: Yes  Response To Previous Treatment: Patient with no complaints from previous session. Family / Caregiver Present: No  Subjective  Subjective: RN and pt agreeable to PT. Pt alert in chair upon arrival.  General Comment  Comments: OT co-eval  Pain Screening  Patient Currently in Pain: Yes  Pain Assessment  Pain Assessment: 0-10  Pain Level: 2  Pain Type: Acute pain;Surgical pain  Pain Location: Chest;Incision  Pain Intervention(s): Ambulation/Increased activity; Emotional support  Response to Pain Intervention: Patient Satisfied  Vital Signs  Patient Currently in Pain: Yes  Pre Treatment Pain Screening  Intervention List: Patient able to continue with treatment    Orientation  Orientation  Overall Orientation Status: Within Functional Limits  Objective   Bed mobility  Supine to Sit: Minimal assistance  Sit to Supine:  (left in chair)  Scooting: Contact guard assistance  Transfers  Sit to Stand: Minimal Assistance  Stand to sit: Contact guard assistance (verbal cueing throughout)  Ambulation  Ambulation?: Yes  Ambulation 1  Surface: level tile  Device: Rolling Walker  Assistance: Contact guard assistance  Quality of Gait: increasing kristie but remained slow, increased sway and end of distance  Distance: 100'  Comment: 2L 02  Stairs/Curb  Stairs?: No     Balance  Posture: Fair  Sitting - Static: Good  Sitting - Dynamic: Good  Standing - Static: Good;- (RW)  Standing - Dynamic: Fair;+ (RW)      AROM RLE (degrees)  RLE AROM: WFL  AROM LLE (degrees)  LLE AROM : WFL  AROM RUE (degrees)  RUE AROM : WFL  AROM LUE (degrees)  LUE AROM : WFL  Strength RLE  Strength RLE: WFL  Strength LLE  Strength LLE: WFL  Strength RUE  Strength RUE: WFL  Strength LUE  Strength LUE: WFL                 Assessment   Body structures, Functions, Activity limitations: Decreased functional mobility   Assessment: amb 100' RW CGA. Pt limited by onset dizzyness, RN aware and pt left with RN supine in bed. CTA/ home assist prn pending progress  Prognosis: Good  Decision Making: Medium Complexity  Patient Education: PT POC  REQUIRES PT FOLLOW UP: Yes  Activity Tolerance  Activity Tolerance: Other;Treatment limited secondary to medical complications (free text)  Activity Tolerance: First attempt standing pt reported ligheadedness and need to sit d/t sensation of fainting. Pt without any syncopal episode with Sp02 100% and BP WNL, pt was slightly diaphoretic. RN present, took blood sugar. Then pt ambulation with RN present without incident until end of ambulation with repeat incident. Pt returned to room with W/C and to supine in bed, cold clammy hands, diaphoretic. pt notes ligheadedness improving once supine. RN still present, pt left with RN.   PT Equipment Recommendations  Equipment Needed: No       Discharge Recommendations:  Continue to assess pending progress, Home with assist PRN    G-Code  PT G-Codes  Functional Assessment Tool Used: [place] tool  Score: 14  Functional

## 2017-11-01 NOTE — PROGRESS NOTES
Pt had weaned on CPAP 5 with PS +6 and FiO2 40% for 35 minutes per Dr Sreekanth Zhou     ABG and pulmonary mechanics during wean at 35 min 0510     PH 7.37   PCO2 38  Po2 116.9  SaO2 98.5   HCO3 21.9  BE -3.1      RR 15 and non labored  Vt 550  VC 1000 ml  MVe 8.2 lpm  Positive Cuff Leak  NIF -30 cmH20    Dr Sreekanth Zhou notified via phone by RN        Order obtain for extubation. SpO2 of 100 on 40% FiO2. Patient extubated and placed on 4 liters/min via nasal cannula. Post extubation SpO2 is 10% with HR  79 bpm and RR 18 non labored breaths/min. Patient had moderate cough that was productive of creamy sputum. Extubation Well tolerated by patient. .   Breath Sounds: clear bilaterally     Verneice Fall   5:47 AM

## 2017-11-01 NOTE — FLOWSHEET NOTE
· Patient sitting in chair and conversing easily with nurse. Patient stated he is feeling tired but good overall. Patient engaged well in conversation expressing his gratitude for the wonderful care he has received here. · Patient spoke at length of his [employer] in [facility], stating that his  had stopped into see him. ·  provided a listening presence and encouragement to patient. 11/01/17 1426   Encounter Summary   Services provided to: Patient   Referral/Consult From: Nurse   Continue Visiting (11/1/17)   Complexity of Encounter Low   Length of Encounter 30 minutes   Spiritual Assessment Completed Yes   Routine   Type Post-procedure   Assessment Calm; Approachable   Intervention Active listening;Explored feelings, thoughts, concerns;Sustaining presence/ Ministry of presence   Outcome Expressed gratitude;Expressed feelings/needs/concerns;Engaged in conversation;Receptive; Hopeful

## 2017-11-01 NOTE — ANESTHESIA POSTPROCEDURE EVALUATION
Department of Anesthesiology  Postprocedure Note    Patient: Betty Field  MRN: [id number]  YOB: 1940  Date of evaluation: 11/1/2017  Time:  1:49 AM     Procedure Summary     Date:  10/31/17 Room / Location:  [address] [place]    Anesthesia Start:  2146 Anesthesia Stop:  1933    Procedure:  CORONARY ARTERY BYPASS GRAFT (OFF PUMP) X6: LIMA-LAD-DIAG, SVG-OM1-OM2, SVG-PDA-PLB; SWAN, ALIA PER ANESTHESIA (N/A ) Diagnosis:  (CORONARY ARTERY DISEASE)    Surgeon:  Bernie Vaz MD Responsible Provider:  Liliana Scott MD    Anesthesia Type:  general ASA Status:  3          Anesthesia Type: general    Kelly Phase I:      Kelly Phase II:      Last vitals: Reviewed and per EMR flowsheets.        Anesthesia Post Evaluation    Patient location during evaluation: ICU  Patient participation: complete - patient cannot participate  Level of consciousness: sedated and ventilated  Pain score: 0  Airway patency: patent  Nausea & Vomiting: no nausea and no vomiting  Complications: no  Cardiovascular status: blood pressure returned to baseline  Respiratory status: intubated  Hydration status: euvolemic  Comments:   Mechanical Ventilation Data  SETTINGS (Comprehensive)  Vent Information  Vent Type: Servo i  Vent Mode: SIMV/PRVC  Vt Ordered: 750 mL  Vt Exhaled: 753 mL  Rate Set: 14 bmp  Rate Measured: 14 br/min  Minute Volume: 10.5 Liters  Pressure Support: 6 cmH20  FiO2 : 40 %  Peak Inspiratory Pressure: 25 cmH2O  I:E Ratio: 1:2.0  Sensitivity: 5  High Peep/I Pressure: 10  PEEP/CPAP: 5  I Time/ I Time %: 0.9 s  High Pressure Alarm: 60 cmH2O  Low Minute Volume Alarm: 3 L/min  High Respiratory Rate: 30 br/min  Mean Airway Pressure: 9 cmH20  Plateau Pressure: 24 cmH20  Static Compliance: 50 mL/cmH2O  Dynamic Compliance: 82 mL/cmH2O  Low PEEP: 2 cmH2O  Total PEEP: 6 cmH20  Auto PEEP: 0 cmH20  Additional Respiratory  Assessments  Pulse: 70  Resp: 16  SpO2: 100 %  End Tidal CO2: 30 (%)  Position: Semi-Vargas's  E ([facility]

## 2017-11-02 LAB
ANION GAP SERPL CALCULATED.3IONS-SCNC: 9 MMOL/L (ref 9–17)
BUN BLDV-MCNC: 15 MG/DL (ref 8–23)
BUN/CREAT BLD: ABNORMAL (ref 9–20)
CALCIUM SERPL-MCNC: 8 MG/DL (ref 8.6–10.4)
CHLORIDE BLD-SCNC: 104 MMOL/L (ref 98–107)
CO2: 27 MMOL/L (ref 20–31)
CREAT SERPL-MCNC: 0.87 MG/DL (ref 0.7–1.2)
GFR AFRICAN AMERICAN: >60 ML/MIN
GFR NON-AFRICAN AMERICAN: >60 ML/MIN
GFR SERPL CREATININE-BSD FRML MDRD: ABNORMAL ML/MIN/{1.73_M2}
GFR SERPL CREATININE-BSD FRML MDRD: ABNORMAL ML/MIN/{1.73_M2}
GLUCOSE BLD-MCNC: 112 MG/DL (ref 70–99)
GLUCOSE BLD-MCNC: 117 MG/DL (ref 75–110)
GLUCOSE BLD-MCNC: 120 MG/DL (ref 75–110)
GLUCOSE BLD-MCNC: 138 MG/DL (ref 75–110)
GLUCOSE BLD-MCNC: 144 MG/DL (ref 75–110)
GLUCOSE BLD-MCNC: 95 MG/DL (ref 75–110)
HCT VFR BLD CALC: 26.4 % (ref 40.7–50.3)
HEMOGLOBIN: 7.9 G/DL (ref 13–17)
MAGNESIUM: 2.1 MG/DL (ref 1.6–2.6)
MCH RBC QN AUTO: 27.7 PG (ref 25.2–33.5)
MCHC RBC AUTO-ENTMCNC: 29.9 G/DL (ref 29.9–34.7)
MCV RBC AUTO: 92.6 FL (ref 82.6–102.9)
PDW BLD-RTO: 16 % (ref 11.8–14.4)
PLATELET # BLD: 145 K/UL (ref 138–453)
PMV BLD AUTO: 10.1 FL (ref 8.1–13.5)
POTASSIUM SERPL-SCNC: 3.8 MMOL/L (ref 3.7–5.3)
RBC # BLD: 2.85 M/UL (ref 4.21–5.77)
SODIUM BLD-SCNC: 140 MMOL/L (ref 135–144)
WBC # BLD: 10.9 K/UL (ref 3.5–11.3)

## 2017-11-02 PROCEDURE — 85027 COMPLETE CBC AUTOMATED: CPT

## 2017-11-02 PROCEDURE — 6370000000 HC RX 637 (ALT 250 FOR IP): Performed by: NURSE PRACTITIONER

## 2017-11-02 PROCEDURE — 6370000000 HC RX 637 (ALT 250 FOR IP): Performed by: THORACIC SURGERY (CARDIOTHORACIC VASCULAR SURGERY)

## 2017-11-02 PROCEDURE — 2140000001 HC CVICU INTERMEDIATE R&B

## 2017-11-02 PROCEDURE — 97530 THERAPEUTIC ACTIVITIES: CPT

## 2017-11-02 PROCEDURE — 97116 GAIT TRAINING THERAPY: CPT

## 2017-11-02 PROCEDURE — 6360000002 HC RX W HCPCS: Performed by: THORACIC SURGERY (CARDIOTHORACIC VASCULAR SURGERY)

## 2017-11-02 PROCEDURE — 82947 ASSAY GLUCOSE BLOOD QUANT: CPT

## 2017-11-02 PROCEDURE — 97535 SELF CARE MNGMENT TRAINING: CPT

## 2017-11-02 PROCEDURE — 99024 POSTOP FOLLOW-UP VISIT: CPT | Performed by: NURSE PRACTITIONER

## 2017-11-02 PROCEDURE — 80048 BASIC METABOLIC PNL TOTAL CA: CPT

## 2017-11-02 PROCEDURE — 83735 ASSAY OF MAGNESIUM: CPT

## 2017-11-02 PROCEDURE — 36415 COLL VENOUS BLD VENIPUNCTURE: CPT

## 2017-11-02 PROCEDURE — 2580000003 HC RX 258: Performed by: THORACIC SURGERY (CARDIOTHORACIC VASCULAR SURGERY)

## 2017-11-02 PROCEDURE — 6360000002 HC RX W HCPCS: Performed by: NURSE PRACTITIONER

## 2017-11-02 PROCEDURE — 94762 N-INVAS EAR/PLS OXIMTRY CONT: CPT

## 2017-11-02 PROCEDURE — 97110 THERAPEUTIC EXERCISES: CPT

## 2017-11-02 RX ORDER — POTASSIUM CHLORIDE 20MEQ/15ML
40 LIQUID (ML) ORAL PRN
Status: DISCONTINUED | OUTPATIENT
Start: 2017-11-02 | End: 2017-11-04 | Stop reason: HOSPADM

## 2017-11-02 RX ORDER — POTASSIUM CHLORIDE 7.45 MG/ML
10 INJECTION INTRAVENOUS PRN
Status: DISCONTINUED | OUTPATIENT
Start: 2017-11-02 | End: 2017-11-04 | Stop reason: HOSPADM

## 2017-11-02 RX ORDER — POTASSIUM CHLORIDE 20 MEQ/1
40 TABLET, EXTENDED RELEASE ORAL PRN
Status: DISCONTINUED | OUTPATIENT
Start: 2017-11-02 | End: 2017-11-04 | Stop reason: HOSPADM

## 2017-11-02 RX ORDER — FUROSEMIDE 10 MG/ML
40 INJECTION INTRAMUSCULAR; INTRAVENOUS ONCE
Status: COMPLETED | OUTPATIENT
Start: 2017-11-02 | End: 2017-11-02

## 2017-11-02 RX ADMIN — POTASSIUM CHLORIDE 30 MEQ: 20 TABLET, EXTENDED RELEASE ORAL at 07:04

## 2017-11-02 RX ADMIN — OXYCODONE HYDROCHLORIDE 5 MG: 5 TABLET ORAL at 16:04

## 2017-11-02 RX ADMIN — CLOPIDOGREL 75 MG: 75 TABLET, FILM COATED ORAL at 08:20

## 2017-11-02 RX ADMIN — Medication 15 ML: at 08:21

## 2017-11-02 RX ADMIN — OXYCODONE HYDROCHLORIDE 10 MG: 5 TABLET ORAL at 11:59

## 2017-11-02 RX ADMIN — DOCUSATE SODIUM 100 MG: 100 CAPSULE, LIQUID FILLED ORAL at 21:00

## 2017-11-02 RX ADMIN — OXYCODONE HYDROCHLORIDE 5 MG: 5 TABLET ORAL at 21:02

## 2017-11-02 RX ADMIN — FENTANYL CITRATE 50 MCG: 50 INJECTION INTRAMUSCULAR; INTRAVENOUS at 17:23

## 2017-11-02 RX ADMIN — METOPROLOL TARTRATE 12.5 MG: 25 TABLET ORAL at 08:21

## 2017-11-02 RX ADMIN — Medication 10 ML: at 08:21

## 2017-11-02 RX ADMIN — CELECOXIB 100 MG: 100 CAPSULE ORAL at 08:20

## 2017-11-02 RX ADMIN — METOPROLOL TARTRATE 12.5 MG: 25 TABLET ORAL at 21:00

## 2017-11-02 RX ADMIN — PANTOPRAZOLE SODIUM 40 MG: 40 TABLET, DELAYED RELEASE ORAL at 07:04

## 2017-11-02 RX ADMIN — ENOXAPARIN SODIUM 40 MG: 40 INJECTION SUBCUTANEOUS at 08:20

## 2017-11-02 RX ADMIN — DOCUSATE SODIUM 100 MG: 100 CAPSULE, LIQUID FILLED ORAL at 08:20

## 2017-11-02 RX ADMIN — CELECOXIB 100 MG: 100 CAPSULE ORAL at 21:00

## 2017-11-02 RX ADMIN — FUROSEMIDE 40 MG: 10 INJECTION, SOLUTION INTRAMUSCULAR; INTRAVENOUS at 08:21

## 2017-11-02 RX ADMIN — MULTIPLE VITAMINS W/ MINERALS TAB 1 TABLET: TAB at 08:20

## 2017-11-02 RX ADMIN — Medication 10 ML: at 21:01

## 2017-11-02 RX ADMIN — POLYETHYLENE GLYCOL 3350 17 G: 17 POWDER, FOR SOLUTION ORAL at 08:20

## 2017-11-02 RX ADMIN — Medication 15 ML: at 21:00

## 2017-11-02 RX ADMIN — SIMVASTATIN 20 MG: 20 TABLET, FILM COATED ORAL at 21:00

## 2017-11-02 ASSESSMENT — PAIN SCALES - GENERAL
PAINLEVEL_OUTOF10: 4
PAINLEVEL_OUTOF10: 5
PAINLEVEL_OUTOF10: 4
PAINLEVEL_OUTOF10: 4
PAINLEVEL_OUTOF10: 5
PAINLEVEL_OUTOF10: 4
PAINLEVEL_OUTOF10: 7

## 2017-11-02 ASSESSMENT — PAIN DESCRIPTION - PAIN TYPE: TYPE: SURGICAL PAIN

## 2017-11-02 ASSESSMENT — PAIN DESCRIPTION - LOCATION: LOCATION: STERNUM

## 2017-11-02 ASSESSMENT — PAIN DESCRIPTION - ORIENTATION: ORIENTATION: MID

## 2017-11-02 NOTE — PROGRESS NOTES
[facility]   Progress Note                 Date:   11/2/2017  Patient name:  Ann Marie Mccurdy  Date of admission:  10/28/2017 10:42 PM  MRN:   [id number]  YOB: 1940  PCP:    Luis Fernando Resendez MD    Reason for Admission: NSTEMI      Subjective:       Clinical Changes / Abnormalities:     Patient seen and examined. Doing well. Working with therapy. S/P chest tube, pacer wire, swan muriel, and arterial line removal. D/W RN. No issues overnight. Medications:   Scheduled Meds:    metoprolol tartrate  12.5 mg Oral BID    docusate sodium  100 mg Oral BID    polyethylene glycol  17 g Oral Daily    enoxaparin  40 mg Subcutaneous Daily    celecoxib  100 mg Oral BID    pantoprazole  40 mg Oral QAM AC    chlorhexidine  15 mL Mouth/Throat BID    therapeutic multivitamin-minerals  1 tablet Oral Daily with breakfast    simvastatin  20 mg Oral Nightly    aspirin  150 mg Rectal Once    clopidogrel  75 mg Oral Daily    insulin lispro  0-6 Units Subcutaneous TID WC    insulin lispro  0-3 Units Subcutaneous Nightly     Continuous Infusions:    insulin (HUMAN R) non-weight based infusion 0.97 Units/kg/hr (11/01/17 1701)    dextrose      sodium chloride      dextrose       CBC:   Recent Labs      10/31/17   2005  11/01/17   0522  11/02/17   0549   WBC  21.2*  11.2  10.9   HGB  9.8*  9.0*  7.9*   PLT  177  155  145     BMP:    Recent Labs      10/31/17   0341  10/31/17   2009  11/01/17   0126  11/01/17   0522  11/02/17   0549   NA  137   --    --   139  140   K  4.1   --   4.5  4.1  3.8   CL  103   --    --   107  104   CO2  21   --    --   20  27   BUN  23   --    --   14  15   CREATININE  0.86  0.53   --   0.63*  0.87   GLUCOSE  89   --    --   93  112*     Hepatic: No results for input(s): AST, ALT, ALB, BILITOT, ALKPHOS in the last 72 hours. Troponin: No results for input(s): TROPONINI in the last 72 hours. BNP: No results for input(s): BNP in the last 72 hours.   Lipids: No

## 2017-11-02 NOTE — PROGRESS NOTES
Assessment: amb 300' x 1,RW, CGA. Pt tolerated treatment well. Home assist prn.   Prognosis: Good  Decision Making: Medium Complexity  Patient Education: PT POC  REQUIRES PT FOLLOW UP: Yes  Activity Tolerance  Activity Tolerance: Patient Tolerated treatment well;Patient limited by pain  PT Equipment Recommendations  Equipment Needed: No           Discharge Recommendations:  Home with assist PRN    G-Code     OutComes Score   Goals  Short term goals  Time Frame for Short term goals: 20 visits  Short term goal 1: Pt will be I with bed mobility  Short term goal 2: Pt will be I with transfers  Short term goal 3: Pt will be I with amb 300' or Otto with least restrictive AD  Short term goal 4: Pt will navigate 4 steps L rail Otto    Plan    Plan  Times per week: 7x/wk BID  Current Treatment Recommendations: Strengthening, ROM, Balance Training, Gait Training, Functional Mobility Training, Stair training, Endurance Training, Home Exercise Program, Safety Education & Training, Patient/Caregiver Education & Training, Equipment Evaluation, Education, & procurement  Safety Devices  Type of devices: Left in chair  Restraints  Initially in place: No     Therapy Time   Individual Concurrent Group Co-treatment   Time In  830         Time Out  915         Minutes  [address] [place] [address]

## 2017-11-02 NOTE — PROGRESS NOTES
Physical Therapy  Facility/Department: [facility] CAR 1  Daily Treatment Note  NAME: Vaishali Cain  : 1940  MRN: [id number]    Date of Service: 2017    Patient Diagnosis(es): There are no active problems to display for this patient. Past Medical History:   Diagnosis Date    Rheumatoid arteritis      Past Surgical History:   Procedure Laterality Date    CORONARY ARTERY BYPASS GRAFT N/A 10/31/2017    CORONARY ARTERY BYPASS GRAFT (OFF PUMP) X6: LIMA-LAD-DIAG, SVG-OM1-OM2, SVG-PDA-PLB; SWAN, ALIA PER ANESTHESIA performed by Renay Mccray MD at [address] [place] [address] Right 2017    hip reeplacement        Restrictions  Restrictions/Precautions  Restrictions/Precautions: Cardiac, Fall Risk  Required Braces or Orthoses?: No  Position Activity Restriction  Sternal Precautions: No Pushing, No Pulling, 5# Lifting Restrictions  Other position/activity restrictions: Up to chair Day of surgery; CABGx6 10/31/17  Subjective    Pt sitting up in his chair upon entry. Pt's wife and daughter are present. Pt has no c/o pain   Orientation    Overall Orientation Status: Within Functional Limits  Objective     Transfers  Sit to Stand: Minimal Assistance  Stand to sit: Contact guard assistance (verbal cuesing on hand placement)  Ambulation  Ambulation?: Yes  Ambulation 1  Surface: level tile  Device: Rolling Walker  Assistance: Contact guard assistance  Quality of Gait: steady, slow pace  Distance: 300' x 1  Stairs/Curb  Stairs?: No     Balance  Posture: Fair  Sitting - Static: Good  Sitting - Dynamic: Good  Standing - Static: Good;- (RW)  Standing - Dynamic: Fair;+ (RW)   Ex's  Seated LE exercise program: [place] Quads,heel/toe raises, and marches. Reps: 20 x each with 2 lb wt on B LE's. Assessment     Body structures, Functions, Activity limitations: Decreased functional mobility   Assessment: amb 300' x 1,RW, CGA. Pt tolerated treatment well. Home assist prn.   Prognosis: Good  Decision Making: Medium Complexity  Patient Education: PT POC,[place] HEP.   REQUIRES PT FOLLOW UP: Yes  Activity Tolerance  Activity Tolerance: Patient Tolerated treatment well;Patient limited by pain  PT Equipment Recommendations  Equipment Needed: No           Discharge Recommendations:  Home with assist PRN    G-Code     OutComes Score   Goals  Short term goals  Time Frame for Short term goals: 20 visits  Short term goal 1: Pt will be I with bed mobility  Short term goal 2: Pt will be I with transfers  Short term goal 3: Pt will be I with amb 300' or Otto with least restrictive AD  Short term goal 4: Pt will navigate 4 steps L rail Otto    Plan    Plan  Times per week: 7x/wk BID  Current Treatment Recommendations: Strengthening, ROM, Balance Training, Gait Training, Functional Mobility Training, Stair training, Endurance Training, Home Exercise Program, Safety Education & Training, Patient/Caregiver Education & Training, Equipment Evaluation, Education, & procurement  Safety Devices  Type of devices: Left in chair  Restraints  Initially in place: No     Therapy Time   Individual Concurrent Group Co-treatment   Time In   0100         Time Out   0125         Minutes   25                 [place], [place]

## 2017-11-02 NOTE — PLAN OF CARE
Problem: Falls - Risk of:  Goal: Will remain free from falls  Will remain free from falls   Outcome: Ongoing  Patient educated on activity tolerance, call light within reach, assistive devices within reach      Problem: Falls - Risk of  Goal: Absence of falls  Outcome: Ongoing      Problem: SKIN INTEGRITY  Goal: Skin integrity is maintained or improved  Outcome: Ongoing      Problem: Risk for Impaired Skin Integrity  Goal: Tissue integrity - skin and mucous membranes  Structural intactness and normal physiological function of skin and  mucous membranes.    Outcome: Ongoing      Problem: Cardiac Output - Decreased:  Goal: Cardiac output within specified parameters  Cardiac output within specified parameters   Outcome: Ongoing    Goal: Hemodynamic stability will improve  Hemodynamic stability will improve   Outcome: Ongoing      Problem: Fluid Volume - Imbalance:  Goal: Ability to achieve a balanced intake and output will improve  Ability to achieve a balanced intake and output will improve   Outcome: Ongoing    Goal: Chest tube drainage is within specified parameters  Chest tube drainage is within specified parameters   Outcome: Ongoing      Problem: Pain:  Goal: Pain level will decrease  Pain level will decrease    Outcome: Ongoing    Goal: Control of acute pain  Control of acute pain   Outcome: Ongoing  Patient educated on medications, times on board, call light within reach, educated on 0-10 pain scale  Goal: Control of chronic pain  Control of chronic pain   Outcome: Ongoing      Problem: Pain:  Goal: Pain level will decrease  Pain level will decrease    Outcome: Ongoing

## 2017-11-02 NOTE — PROGRESS NOTES
Occupational Therapy  Facility/Department: [facility] CAR 1  Daily Treatment Note  NAME: Moris Salazar  : 1940  MRN: [id number]    Date of Service: 2017    Patient Diagnosis(es): There were no encounter diagnoses. has a past medical history of Rheumatoid arteritis. has a past surgical history that includes eye surgery; joint replacement (Right, 2017); and Coronary artery bypass graft (N/A, 10/31/2017). Restrictions  Restrictions/Precautions  Restrictions/Precautions: Cardiac, Surgical Protocols, Fall Risk  Required Braces or Orthoses?: No  Position Activity Restriction  Sternal Precautions: No Pushing, No Pulling, 5# Lifting Restrictions  Sternal Precautions: CABG X6 10/31/17  Other position/activity restrictions: Up with assist  Subjective   General  Patient assessed for rehabilitation services?: Yes  Family / Caregiver Present: Yes (Wife and Dgtr)  Pain Assessment  Patient Currently in Pain: Yes  Pain Assessment: 0-10  Pain Level: 4  Pain Type: Surgical pain  Pain Location: Sternum  Pain Orientation: Mid  Pain Intervention(s): Medication (see eMar);RN notified;Distraction  Response to Pain Intervention: Patient Satisfied  Objective    ADL  Feeding: Independent  Grooming: Stand by assistance;Setup; Increased time to complete (standing at sink)  UE Bathing: Minimal assistance (w/back)  LE Bathing: Minimal assistance;Setup; Increased time to complete (w/feet)  UE Dressing: Minimal assistance (w/gown)  LE Dressing: Maximum assistance (w/footies)  Toileting: Maximum assistance (zarate cath)   Balance  Sitting Balance: Stand by assistance (seated in chair)  Standing Balance: Contact guard assistance (use of RW)  Standing Balance  Time: Pt stood for approx 15 min total for transfers, dyn mob, ishaan care and grooming at sink  Sit to stand: Contact guard assistance  Stand to sit: Contact guard assistance  Comment: No LOB  Functional Mobility  Functional - Mobility Device: Rolling Walker  Assist Level: Contact guard assistance     Transfers  Stand Step Transfers: Contact guard assistance  Sit to stand: Contact guard assistance  Stand to sit: Contact guard assistance       Assessment   Performance deficits / Impairments: Decreased functional mobility ; Decreased endurance;Decreased ADL status; Decreased high-level IADLs  Treatment Diagnosis: CABG X6  Prognosis: Good  Patient Education: Educ on OT POC, EC/WS tech, AE/DME, IS use, Sternal Prec, Fall Prev, Safety with func mob and adls. Issued written info and pt verbalized understanding.   Discharge Recommendations: Continue to assess pending progress;Home with assist PRN  REQUIRES OT FOLLOW UP: Yes  Activity Tolerance  Activity Tolerance: Patient Tolerated treatment well;Patient limited by pain  Safety Devices  Safety Devices in place: Yes  Type of devices: Call light within reach;Gait belt;Left in chair;Nurse notified       Discharge Recommendations:  Continue to assess pending progress, Home with assist PRN     Plan   Plan  Times per week: 4-5x/wk   Current Treatment Recommendations: Functional Mobility Training, Endurance Training, Self-Care / ADL, Safety Education & Training, Home Management Training, Patient/Caregiver Education & Training, Equipment Evaluation, Education, & procurement    Goals  Short term goals  Time Frame for Short term goals: by discharge, pt will  Short term goal 1: demo I in UE ADL activities   Short term goal 2: demo MI/I in LE ADL activites with AD as needed  Short term goal 3: demo understanding and I use of EC/WS, sternal precautions, IS device and proper pursed lipped breathing tech during functional activites   Short term goal 4: demo understanding and I use of safe transfer tech during functional activites with LRD  Short term goal 5: demo increased standing tolerance of 15+ min to assist with ADL/ fucntional activites      Therapy Time   Individual Concurrent Group Co-treatment   Time In  1445         Time Out  1630         Minutes

## 2017-11-02 NOTE — PROGRESS NOTES
[facility]  Progress Note    Surgeon: Rekha Calhoun  Procedure:  CABG x 6   POD#: 2  EF: 50%      Subjective:  Mr. Nasir Reyes feels well today with no acute complaints. He is sitting up in the chair, doing crossword puzzles. Pain is controlled. OOBTC and ambulating (did entire unit last night). Last BMs prior to surgery. Good appetite. Denies chest pain or shortness of breath. Plan of care reviewed and questions answered. Physical Exam  Vitals:  Vitals:    11/02/17 0710   BP: (!) 121/54   Pulse: 90   Resp: 17   Temp: 97.7 °F (36.5 °C)   SpO2: 98%     I/O last 3 completed shifts: In: 2853 [P.O.:240; I.V.:1186]  Out: 9804 [Urine:3830; Chest Tube:100]  No intake/output data recorded. General: Alert and Oriented x3. Up in chair. No apparent distress. HEENT:  Normocephalic and atraumatic. PERRL. EOMI. Lips and oral mucosa moist and without lesions. Neck:  Supple. Trachea midline. Chest:  No abnormality. Equal and symmetric expansion with respiration. Lungs:  Clear throughout, diminished bases  Cardiac:  Regular rate and rhythm without murmurs, rubs or gallops. Pacing wires  Yes  Abdomen:  Soft, non-tender, normoactive bowel sounds. No masses or organomegaly. Extremities:  No edema. Intact pulses in all four extremities. Musculoskeletal:  Intact range of motion of peripheral joints. grossly normal  Neurologic:  Cranial nerves are grossly intact. Non-focal sensory deficits on exam.  Psychiatric: Mood and affect are appropriate.   Surgical Wounds: Surgical incisions with clean, dry, intact dressings in place      Current Medications:    Current Facility-Administered Medications:     potassium chloride (KLOR-CON M) extended release tablet 40 mEq, 40 mEq, Oral, PRN, 30 mEq at 11/02/17 0704 **OR** potassium chloride 20 MEQ/15ML (10%) oral solution 40 mEq, 40 mEq, Oral, PRN **OR** potassium chloride 10 mEq/100 mL IVPB (Peripheral Line), 10 mEq, Intravenous, PRN, Jc Raya g, Intravenous, PRN, Ellie Velasco MD    magnesium sulfate 1 g in dextrose 5% 100 mL IVPB, 1 g, Intravenous, PRN, Ellie Velasco MD, Stopped at 11/01/17 0909    potassium chloride 20 mEq/50 mL IVPB (Central Line), 20 mEq, Intravenous, PRN, Ellie Velasco MD, Last Rate: 50 mL/hr at 10/31/17 2315, 20 mEq at 10/31/17 2315    acetaminophen (TYLENOL) tablet 650 mg, 650 mg, Oral, Q4H PRN, Ellie Velasco MD    fentaNYL (SUBLIMAZE) injection 50 mcg, 50 mcg, Intravenous, Q1H PRN, Ellie Velasco MD, 50 mcg at 11/01/17 0045    diphenhydrAMINE (BENADRYL) tablet 25 mg, 25 mg, Oral, Nightly PRN, Ellie Velasco MD    magnesium hydroxide (MILK OF MAGNESIA) 400 MG/5ML suspension 30 mL, 30 mL, Oral, Daily PRN, Ellie Velasco MD    ondansetron (ZOFRAN) injection 4 mg, 4 mg, Intravenous, Q8H PRN, Ellie Velasco MD    metoclopramide (REGLAN) injection 10 mg, 10 mg, Intravenous, Q6H PRN, Ellie Velasco MD    chlorhexidine (PERIDEX) 0.12 % solution 15 mL, 15 mL, Mouth/Throat, BID, Ellie Velasco MD, 15 mL at 11/02/17 6189    hydrALAZINE (APRESOLINE) injection 5 mg, 5 mg, Intravenous, Q5 Min PRN, Ellie Velasco MD    therapeutic multivitamin-minerals 1 tablet, 1 tablet, Oral, Daily with breakfast, Ellie Velasco MD, 1 tablet at 11/02/17 0820    simvastatin (ZOCOR) tablet 20 mg, 20 mg, Oral, Nightly, Ellie Velasco MD, 20 mg at 11/01/17 2059    aspirin suppository 150 mg, 150 mg, Rectal, Once, Ellie Velasco MD    clopidogrel (PLAVIX) tablet 75 mg, 75 mg, Oral, Daily, Ellie Velasco MD, 75 mg at 11/02/17 0820    glucose (GLUTOSE) 40 % oral gel 15 g, 15 g, Oral, PRN, Ellie Velasco MD    dextrose 50 % solution 12.5 g, 12.5 g, Intravenous, PRN, Ellie Velasco MD    glucagon (rDNA) injection 1 mg, 1 mg, Intramuscular, PRN, Ellie Velasco MD    dextrose 5 % solution, 100 mL/hr, Intravenous, PRN, Ellie Velasco MD    insulin lispro (HUMALOG) injection vial 0-6 Units, 0-6 Units, Subcutaneous, TID WC, Jerzy Boateng MD    insulin lispro (HUMALOG) injection vial 0-3 Units, 0-3 Units, Subcutaneous, Nightly, Jerzy Boateng MD    Data:  CBC:   Recent Labs      10/31/17   2005  11/01/17   0522  11/02/17   0549   WBC  21.2*  11.2  10.9   HGB  9.8*  9.0*  7.9*   HCT  30.8*  28.1*  26.4*   MCV  89.0  88.4  92.6   PLT  177  155  145     BMP:   Recent Labs      10/31/17   0341  10/31/17   2005  10/31/17   2009  11/01/17   0126  11/01/17   0522  11/02/17   0549   NA  137   --    --    --   139  140   K  4.1   --    --   4.5  4.1  3.8   CL  103   --    --    --   107  104   CO2  21   --    --    --   20  27   BUN  23   --    --    --   14  15   CREATININE  0.86   --   0.53   --   0.63*  0.87   MG   --   1.5*   --    --   1.5*  2.1     Accucheck Glucoses:   Recent Labs      11/01/17   1517  11/01/17   1700  11/01/17   2050  11/02/17   0002  11/02/17   0714   POCGLU  146*  157*  109  120*  95     Cardiac Enzymes: No results for input(s): CKTOTAL, CKMB, CKMBINDEX, TROPONINI in the last 72 hours. PTT/PT/INR:   No results for input(s): PROTIME, INR in the last 72 hours. Recent Labs      10/30/17   2306  10/31/17   0341  10/31/17   1037   APTT  36.1*  61.5*  64.2*         Assessment & Plan:   1. S/p CABG x 6   POD 2   D/C IV fluids, change to SS insulin, start lopressor, lasix 40mg IV x 1 today   D/c Mendez after lasix   ABLA - Hgb 7.9, post op 9.8. Will continue to monitor labs, hemodynamically stable  2. Post op pain   Much improved after medication changes yesterday      DVT ppx: Lovenox & SCD's while stationary  Patient is on BB,  ASA, Statin therapy per protocol   Plan for discharge Home with assist most likely    The above recommendations including medications and orders were discussed and agreed upon with  the attending on service for the cardiothoracic surgery group today.      Leopoldo Devonshire, CNP  Phone: [phone]

## 2017-11-03 LAB
GLUCOSE BLD-MCNC: 110 MG/DL (ref 75–110)
GLUCOSE BLD-MCNC: 115 MG/DL (ref 75–110)
GLUCOSE BLD-MCNC: 125 MG/DL (ref 75–110)
GLUCOSE BLD-MCNC: 139 MG/DL (ref 75–110)
HCT VFR BLD CALC: 25.9 % (ref 40.7–50.3)
HEMOGLOBIN: 8 G/DL (ref 13–17)
MAGNESIUM: 2 MG/DL (ref 1.6–2.6)
MCH RBC QN AUTO: 28.3 PG (ref 25.2–33.5)
MCHC RBC AUTO-ENTMCNC: 30.9 G/DL (ref 29.9–34.7)
MCV RBC AUTO: 91.5 FL (ref 82.6–102.9)
PDW BLD-RTO: 16.3 % (ref 11.8–14.4)
PLATELET # BLD: 162 K/UL (ref 138–453)
PMV BLD AUTO: 10.2 FL (ref 8.1–13.5)
RBC # BLD: 2.83 M/UL (ref 4.21–5.77)
WBC # BLD: 11.6 K/UL (ref 3.5–11.3)

## 2017-11-03 PROCEDURE — 85027 COMPLETE CBC AUTOMATED: CPT

## 2017-11-03 PROCEDURE — 99024 POSTOP FOLLOW-UP VISIT: CPT | Performed by: NURSE PRACTITIONER

## 2017-11-03 PROCEDURE — 83735 ASSAY OF MAGNESIUM: CPT

## 2017-11-03 PROCEDURE — 6370000000 HC RX 637 (ALT 250 FOR IP): Performed by: NURSE PRACTITIONER

## 2017-11-03 PROCEDURE — 6370000000 HC RX 637 (ALT 250 FOR IP): Performed by: THORACIC SURGERY (CARDIOTHORACIC VASCULAR SURGERY)

## 2017-11-03 PROCEDURE — 97110 THERAPEUTIC EXERCISES: CPT

## 2017-11-03 PROCEDURE — 2140000001 HC CVICU INTERMEDIATE R&B

## 2017-11-03 PROCEDURE — 82947 ASSAY GLUCOSE BLOOD QUANT: CPT

## 2017-11-03 PROCEDURE — 94762 N-INVAS EAR/PLS OXIMTRY CONT: CPT

## 2017-11-03 PROCEDURE — 36415 COLL VENOUS BLD VENIPUNCTURE: CPT

## 2017-11-03 PROCEDURE — 97530 THERAPEUTIC ACTIVITIES: CPT

## 2017-11-03 PROCEDURE — 6360000002 HC RX W HCPCS: Performed by: NURSE PRACTITIONER

## 2017-11-03 PROCEDURE — 6370000000 HC RX 637 (ALT 250 FOR IP): Performed by: INTERNAL MEDICINE

## 2017-11-03 PROCEDURE — 97116 GAIT TRAINING THERAPY: CPT

## 2017-11-03 RX ORDER — ASPIRIN 81 MG/1
81 TABLET ORAL DAILY
Status: DISCONTINUED | OUTPATIENT
Start: 2017-11-03 | End: 2017-11-04 | Stop reason: HOSPADM

## 2017-11-03 RX ADMIN — OXYCODONE HYDROCHLORIDE 5 MG: 5 TABLET ORAL at 06:24

## 2017-11-03 RX ADMIN — METOPROLOL TARTRATE 12.5 MG: 25 TABLET ORAL at 10:54

## 2017-11-03 RX ADMIN — DOCUSATE SODIUM 100 MG: 100 CAPSULE, LIQUID FILLED ORAL at 21:06

## 2017-11-03 RX ADMIN — OXYCODONE HYDROCHLORIDE 5 MG: 5 TABLET ORAL at 16:44

## 2017-11-03 RX ADMIN — OXYCODONE HYDROCHLORIDE 5 MG: 5 TABLET ORAL at 02:20

## 2017-11-03 RX ADMIN — ENOXAPARIN SODIUM 40 MG: 40 INJECTION SUBCUTANEOUS at 08:45

## 2017-11-03 RX ADMIN — ASPIRIN 81 MG: 81 TABLET, COATED ORAL at 10:54

## 2017-11-03 RX ADMIN — POLYETHYLENE GLYCOL 3350 17 G: 17 POWDER, FOR SOLUTION ORAL at 08:46

## 2017-11-03 RX ADMIN — OXYCODONE HYDROCHLORIDE 5 MG: 5 TABLET ORAL at 21:06

## 2017-11-03 RX ADMIN — CLOPIDOGREL 75 MG: 75 TABLET, FILM COATED ORAL at 08:48

## 2017-11-03 RX ADMIN — BISACODYL 10 MG: 10 SUPPOSITORY RECTAL at 08:37

## 2017-11-03 RX ADMIN — Medication 15 ML: at 21:07

## 2017-11-03 RX ADMIN — PANTOPRAZOLE SODIUM 40 MG: 40 TABLET, DELAYED RELEASE ORAL at 06:25

## 2017-11-03 RX ADMIN — CELECOXIB 100 MG: 100 CAPSULE ORAL at 08:46

## 2017-11-03 RX ADMIN — Medication 15 ML: at 08:45

## 2017-11-03 RX ADMIN — DOCUSATE SODIUM 100 MG: 100 CAPSULE, LIQUID FILLED ORAL at 08:45

## 2017-11-03 RX ADMIN — METOPROLOL TARTRATE 12.5 MG: 25 TABLET ORAL at 08:45

## 2017-11-03 RX ADMIN — SIMVASTATIN 20 MG: 20 TABLET, FILM COATED ORAL at 21:06

## 2017-11-03 RX ADMIN — METOPROLOL TARTRATE 25 MG: 25 TABLET ORAL at 21:06

## 2017-11-03 ASSESSMENT — PAIN SCALES - GENERAL
PAINLEVEL_OUTOF10: 5

## 2017-11-03 NOTE — PLAN OF CARE
Problem: Falls - Risk of:  Goal: Will remain free from falls  Will remain free from falls   Outcome: Ongoing    Goal: Absence of physical injury  Absence of physical injury   Outcome: Ongoing      Problem: BLEEDING PRECAUTIONS  Goal: Knowledge of bleeding precautions  Outcome: Ongoing      Problem: Falls - Risk of  Goal: Absence of falls  Outcome: Ongoing      Problem: ASPIRATION PRECAUTIONS  Goal: Patient's risk of aspiration is minimized  Outcome: Ongoing      Problem: SKIN INTEGRITY  Goal: Skin integrity is maintained or improved  Outcome: Ongoing      Problem: Risk for Impaired Skin Integrity  Goal: Tissue integrity - skin and mucous membranes  Structural intactness and normal physiological function of skin and  mucous membranes.    Outcome: Ongoing      Problem: Cardiac Output - Decreased:  Goal: Cardiac output within specified parameters  Cardiac output within specified parameters   Outcome: Ongoing    Goal: Hemodynamic stability will improve  Hemodynamic stability will improve   Outcome: Ongoing      Problem: Fluid Volume - Imbalance:  Goal: Ability to achieve a balanced intake and output will improve  Ability to achieve a balanced intake and output will improve   Outcome: Ongoing    Goal: Chest tube drainage is within specified parameters  Chest tube drainage is within specified parameters   Outcome: Ongoing      Problem: Pain:  Goal: Pain level will decrease  Pain level will decrease    Outcome: Ongoing    Goal: Control of acute pain  Control of acute pain   Outcome: Ongoing    Goal: Control of chronic pain  Control of chronic pain   Outcome: Ongoing      Problem: Musculor/Skeletal Functional Status  Goal: Highest potential functional level  Outcome: Ongoing      Problem: Pain:  Goal: Control of acute pain  Control of acute pain   Outcome: Ongoing    Goal: Control of chronic pain  Control of chronic pain   Outcome: Ongoing    Goal: Pain level will decrease  Pain level will decrease    Outcome: Ongoing

## 2017-11-03 NOTE — PLAN OF CARE
Problem: Falls - Risk of:  Goal: Will remain free from falls  Will remain free from falls   Outcome: Ongoing  Patient free from Falls. Call light within reach, bed locked, bedrails up, bedside table within reach.     Problem: SKIN INTEGRITY  Goal: Skin integrity is maintained or improved  Outcome: Ongoing  Skin assessed no signs of breakdown

## 2017-11-03 NOTE — PROGRESS NOTES
12.5 mg, Oral, BID, Brenda Gordillo MD, 12.5 mg at 11/02/17 2100    glucose (GLUTOSE) 40 % oral gel 15 g, 15 g, Oral, PRN, Brenda Gordillo MD    dextrose 50 % solution 12.5 g, 12.5 g, Intravenous, PRN, Brenda Gordillo MD    glucagon (rDNA) injection 1 mg, 1 mg, Intramuscular, PRN, Brenda Gordillo MD    dextrose 5 % solution, 100 mL/hr, Intravenous, PRN, Brenda Gordillo MD    docusate sodium (COLACE) capsule 100 mg, 100 mg, Oral, BID, Sean Jack CNP, 100 mg at 11/02/17 2100    polyethylene glycol (GLYCOLAX) packet 17 g, 17 g, Oral, Daily, Sean Jack CNP, 17 g at 11/02/17 0820    bisacodyl (DULCOLAX) suppository 10 mg, 10 mg, Rectal, Daily PRN, Sean Jack CNP    enoxaparin (LOVENOX) injection 40 mg, 40 mg, Subcutaneous, Daily, Sean Jack CNP, 40 mg at 11/02/17 0820    oxyCODONE (ROXICODONE) immediate release tablet 5 mg, 5 mg, Oral, Q4H PRN, 5 mg at 11/03/17 0624 **OR** oxyCODONE (ROXICODONE) immediate release tablet 10 mg, 10 mg, Oral, Q4H PRN, Sean Jack CNP, 10 mg at 11/02/17 1159    acetaminophen (OFIRMEV) infusion 1,000 mg, 1,000 mg, Intravenous, Q8H PRN, Sean Jack CNP, Last Rate: 400 mL/hr at 11/01/17 1654, 1,000 mg at 11/01/17 1654    celecoxib (CELEBREX) capsule 100 mg, 100 mg, Oral, BID, Brenda Gordillo MD, 100 mg at 11/02/17 2100    pantoprazole (PROTONIX) tablet 40 mg, 40 mg, Oral, QAM AC, Brenda Gordillo MD, 40 mg at 11/03/17 0625    sodium chloride flush 0.9 % injection 10 mL, 10 mL, Intravenous, PRN, Brenda Gordillo MD, 10 mL at 11/02/17 2101    calcium chloride 1 g in sodium chloride 0.9 % 100 mL IVPB, 1 g, Intravenous, PRN, Brenda Gordillo MD    magnesium sulfate 1 g in dextrose 5% 100 mL IVPB, 1 g, Intravenous, PRN, Brenda Gordillo MD, Stopped at 11/01/17 0909    potassium chloride 20 mEq/50 mL IVPB (Central Line), 20 mEq, Intravenous, PRN, Brenda Gordillo MD, Last Rate: 50 mL/hr at 10/31/17 2315, 20 mEq at 10/31/17 2315    acetaminophen (TYLENOL) tablet 650 mg, 650 mg, Oral, Q4H PRN, Cirilo Gonzalez MD    fentaNYL (SUBLIMAZE) injection 50 mcg, 50 mcg, Intravenous, Q1H PRN, Cirilo Gonzalez MD, 50 mcg at 11/02/17 1723    diphenhydrAMINE (BENADRYL) tablet 25 mg, 25 mg, Oral, Nightly PRN, Cirilo Gonzalez MD    magnesium hydroxide (MILK OF MAGNESIA) 400 MG/5ML suspension 30 mL, 30 mL, Oral, Daily PRN, Cirilo Gonzalez MD    ondansetron (ZOFRAN) injection 4 mg, 4 mg, Intravenous, Q8H PRN, Cirilo Gonzalez MD    metoclopramide (REGLAN) injection 10 mg, 10 mg, Intravenous, Q6H PRN, Cirilo Gonzalez MD    chlorhexidine (PERIDEX) 0.12 % solution 15 mL, 15 mL, Mouth/Throat, BID, Cirilo Gonzalez MD, 15 mL at 11/02/17 2100    hydrALAZINE (APRESOLINE) injection 5 mg, 5 mg, Intravenous, Q5 Min PRN, Cirilo Gonzalez MD    therapeutic multivitamin-minerals 1 tablet, 1 tablet, Oral, Daily with breakfast, Cirilo Gonzalez MD, 1 tablet at 11/02/17 0820    simvastatin (ZOCOR) tablet 20 mg, 20 mg, Oral, Nightly, Cirilo Gonzalez MD, 20 mg at 11/02/17 2100    aspirin suppository 150 mg, 150 mg, Rectal, Once, Cirilo Gonzalez MD    clopidogrel (PLAVIX) tablet 75 mg, 75 mg, Oral, Daily, Cirilo Gonzalez MD, 75 mg at 11/02/17 0820    glucose (GLUTOSE) 40 % oral gel 15 g, 15 g, Oral, PRN, Cirilo Gonzalez MD    dextrose 50 % solution 12.5 g, 12.5 g, Intravenous, PRN, Cirilo Gonzalez MD    glucagon (rDNA) injection 1 mg, 1 mg, Intramuscular, PRN, Cirilo Gonzalez MD    dextrose 5 % solution, 100 mL/hr, Intravenous, PRN, Cirilo Gonzalez MD    insulin lispro (HUMALOG) injection vial 0-6 Units, 0-6 Units, Subcutaneous, TID WC, Cirilo Gonzalez MD    insulin lispro (HUMALOG) injection vial 0-3 Units, 0-3 Units, Subcutaneous, Nightly, Cirilo Gonzalez MD    Data:  CBC:   Recent Labs      11/01/17   0522  11/02/17   0549  11/03/17   0448   WBC  11.2  10.9  11.6*   HGB  9.0*  7.9*  8.0*   HCT  28.1*  26.4*  25.9*   MCV  88.4  92.6  91.5   PLT  155  145  162     BMP:   Recent

## 2017-11-03 NOTE — CARE COORDINATION
[facility] to inform of probable DC tomorrow.   Patient and family in agreement with discharge plan

## 2017-11-03 NOTE — PLAN OF CARE
Problem: Falls - Risk of:  Goal: Will remain free from falls  Will remain free from falls   Outcome: Met This Shift    Goal: Absence of physical injury  Absence of physical injury   Outcome: Met This Shift      Problem: Falls - Risk of  Goal: Absence of falls  Outcome: Met This Shift      Problem: Pain:  Goal: Pain level will decrease  Pain level will decrease    Outcome: Met This Shift    Goal: Control of acute pain  Control of acute pain   Outcome: Met This Shift      Problem: Pain:  Goal: Pain level will decrease  Pain level will decrease    Outcome: Met This Shift

## 2017-11-03 NOTE — PROGRESS NOTES
Nutrition Education    Type and Reason for Visit: Consult, Patient Education    Patient stated understanding of diet education provided. Pt stated he \"eats fairly healthy\" at home. Pt reports appetite being low today. Reviewed and discussed diet educational handout with pt. All questions answered at this time. · Verbally reviewed following information with pt. · Written educational materials provided. · Contact name and number provided. · Refer to Patient Education activity for more details.     Electronically signed by Delisa Crowe DTR on 11/3/17 at 9:46 AM    Contact Number: [phone]

## 2017-11-03 NOTE — PROGRESS NOTES
Physical Therapy  [facility]  Daily Treatment Note  NAME: Navdeep Anderson  : 1940  MRN: [id number]    Date of Service: 11/3/2017    Patient Diagnosis(es): There are no active problems to display for this patient. Past Medical History:   Diagnosis Date    Rheumatoid arteritis      Past Surgical History:   Procedure Laterality Date    CORONARY ARTERY BYPASS GRAFT N/A 10/31/2017    CORONARY ARTERY BYPASS GRAFT (OFF PUMP) X6: LIMA-LAD-DIAG, SVG-OM1-OM2, SVG-PDA-PLB; SWAN, ALIA PER ANESTHESIA performed by Delgado Vitale MD at [address] [place] [address] Right 2017    hip reeplacement        Restrictions  Restrictions/Precautions  Restrictions/Precautions: Cardiac, Surgical Protocols, Fall Risk  Required Braces or Orthoses?: No  Position Activity Restriction  Sternal Precautions: No Pushing, No Pulling, 5# Lifting Restrictions  Sternal Precautions: CABG X6 10/31/17  Other position/activity restrictions: Up with assist  Subjective    Pt sitting up in his chair upon entry. Pt has no c/o pain   Orientation    Overall Orientation Status: Within Functional Limits  Objective     Transfers  Sit to Stand: CGA  Stand to sit: Contact guard assistance (verbal cuesing on hand placement)  Ambulation  Ambulation?: Yes  Ambulation 1  Surface: level tile  Device: No Device  Assistance: Contact guard assistance/SBA  Quality of Gait: steady, slow pace  Distance: 300' x 1 Pt took two standing rest breaks d/t fatigue per pt. Stairs/Curb  Stairs?: Yes, 11-3-17  Pt ascended 8 steps using one HR with his L LE with each step. Pt descended 8 steps using one HR using his R LE with each step. CGA  Comment: Pt states he has arthritis in his R knee.     Balance  Posture: Fair  Sitting - Static: Good  Sitting - Dynamic: Good  Standing - Static: Good;-   Standing - Dynamic: Fair;+    Ex's  Standing exercise program: Heel/toe raises, hip flexion, hip abduction, extension, and hamstring curls. Reps: 10 x each with 2 lb wt on B LE's. Pt doesn't like single stance ex's d/t R knee arthritis. Assessment     Body structures, Functions, Activity limitations: Decreased functional mobility   Assessment: amb 300' x 1,No Device, CGA. Pt tolerated treatment well. Home assist prn. Prognosis: Good  Decision Making: Medium Complexity  Patient Education: PT POC,Cadiac HEP.   REQUIRES PT FOLLOW UP: Yes  Activity Tolerance  Activity Tolerance: Patient Tolerated treatment well;Patient limited by pain  PT Equipment Recommendations  Equipment Needed: No           Discharge Recommendations:  Home with assist PRN    G-Code     OutComes Score   Goals  Short term goals  Time Frame for Short term goals: 20 visits  Short term goal 1: Pt will be I with bed mobility  Short term goal 2: Pt will be I with transfers  Short term goal 3: Pt will be I with amb 300' or Otto with least restrictive AD  Short term goal 4: Pt will navigate 4 steps L rail Otto    Plan    Plan  Times per week: 7x/wk BID  Current Treatment Recommendations: Strengthening, ROM, Balance Training, Gait Training, Functional Mobility Training, Stair training, Endurance Training, Home Exercise Program, Safety Education & Training, Patient/Caregiver Education & Training, Equipment Evaluation, Education, & procurement  Safety Devices  Type of devices: Left in chair  Restraints  Initially in place: No     Therapy Time   Individual Concurrent Group Co-treatment   Time In   1000         Time Out   1040         Minutes   [address] [place] [address]

## 2017-11-03 NOTE — PROGRESS NOTES
Physical Therapy  Facility/Department: [facility] CAR 1  Daily Treatment Note  NAME: Martha Cleaning  : 1940  MRN: [id number]    Date of Service: 11/3/2017    Patient Diagnosis(es): There are no active problems to display for this patient. Past Medical History:   Diagnosis Date    Rheumatoid arteritis      Past Surgical History:   Procedure Laterality Date    CORONARY ARTERY BYPASS GRAFT N/A 10/31/2017    CORONARY ARTERY BYPASS GRAFT (OFF PUMP) X6: LIMA-LAD-DIAG, SVG-OM1-OM2, SVG-PDA-PLB; SWAN, ALIA PER ANESTHESIA performed by Abdirahman You MD at [address] [place] [address] Right 2017    hip reeplacement        Restrictions  Restrictions/Precautions  Restrictions/Precautions: Cardiac, Surgical Protocols, Fall Risk  Required Braces or Orthoses?: No  Position Activity Restriction  Sternal Precautions: No Pushing, No Pulling, 5# Lifting Restrictions  Sternal Precautions: CABG X6 10/31/17  Other position/activity restrictions: Up with assist  Subjective   General  Chart Reviewed: Yes  Response To Previous Treatment: Patient with no complaints from previous session. Family / Caregiver Present: No  Subjective  Subjective: Pt in bed upon arrival to room. Agreeable to ambulation.    General Comment  Comments: Pt left in chair at end of session with BLEs elevated and call light in reach  Pain Screening  Patient Currently in Pain: Denies  Vital Signs  Patient Currently in Pain: Denies       Orientation  Orientation  Overall Orientation Status: Within Normal Limits  Objective   Bed mobility  Supine to Sit: Stand by assistance  Scooting: Stand by assistance  Transfers  Sit to Stand: Stand by assistance  Stand to sit: Stand by assistance  Ambulation  Ambulation?: Yes  Ambulation 1  Surface: level tile  Device: No Device  Assistance: Contact guard assistance  Quality of Gait: waddling gait, shortened step length bilaterally  Distance: 300ft  Comments: no LOB noted  Stairs/Curb  Stairs?: No (pt declined stairs in the PM d/t B foot pain secondary to arthritis)     Balance  Posture: Fair  Sitting - Static: Good  Sitting - Dynamic: Good  Standing - Static: Good;-  Standing - Dynamic: Fair;+ (demos anterior lean)       Exercises:  Seated LE exercise program: Long Arc Quads, hip abduction/adduction, heel/toe raises, and marches. Reps: 20x each, 2#  Pt declined standing ex's d/t B foot pain  Upper extremity exercises: Bicep curl, shoulder flexion/extension, punches, tricep curl, shoulder abduction/adduction. Reps: 20x each within sternal precautions     Assessment   Body structures, Functions, Activity limitations: Decreased endurance;Decreased strength  Assessment: Pt completing functional mobility with SBA and displays no LOB during ambulation. Prognosis: Good  REQUIRES PT FOLLOW UP: Yes  Activity Tolerance  Activity Tolerance: Patient Tolerated treatment well       Discharge Recommendations:  Home with assist PRN      Goals  Short term goals  Time Frame for Short term goals: 20 visits  Short term goal 1: Pt will be I with bed mobility  Short term goal 2: Pt will be I with transfers  Short term goal 3: Pt will be I with amb 300' or Otto with least restrictive AD  Short term goal 4: Pt will navigate 4 steps L rail Otto    Plan    Plan  Times per week: QD  Current Treatment Recommendations: Strengthening, ROM, Balance Training, Gait Training, Functional Mobility Training, Stair training, Endurance Training, Home Exercise Program, Safety Education & Training, Patient/Caregiver Education & Training, Equipment Evaluation, Education, & procurement  Safety Devices  Type of devices:  All fall risk precautions in place, Call light within reach, Left in chair  Restraints  Initially in place: No     Therapy Time   Individual Concurrent Group Co-treatment   Time In 0156         Time Out 0227         Minutes [address], [place]

## 2017-11-03 NOTE — OP NOTE
that area. An arteriotomy was made and a  side venotomy was made on the vein graft and a side-to-side  anastomosis was created with 8-0 Prolene continuous suture technique. Once all the distal anastomosis were completed, the proximal  anastomosis were done with partial occluding clamp. Two aortotomies  were made and proximal anastomosis were completed with 6-0 Prolene  continuous suture technique. The grafts were temporarily occluded. Partial occluding clamp was removed and grafts were deaired and  opened. Distal anastomosis were checked. These were satisfactory. Heparin was reversed with protamine infusion. Intraoperative  ultrasonic guidance was obtained and was excellent on the LAD diagonal  and the right-sided grafts; however, the flow was sluggish in the OM1  and OM2 graft. Because of OM2 was somewhat small, the flow should  have been better because of the OM1 anastomosis. The patient was  re-heparinized. The OM1 was then set up. The anastomoses were taken  down after controlling the vein graft proximally and distally. The  anastomosis was revised using 8-0 Prolene continuous suture technique. Once this was done, the graft was opened. The intraoperative  ultrasonic guidance was obtained and showed excellent flow in the vein  graft. The heparin was not reversed. Hemostasis was checked, it was  satisfactory. Right pleuropericardial reflection was reattached to  the diaphragm. Three chest tubes were placed and sternum was  approximated with interrupted #7 stainless steel wires. Linea alba  and presternal fascia were closed with #1 Vicryl. Subcutaneous tissue  was closed 3-0 Vicryl and the skin was closed with 4-0 Vicryl  subcuticular sutures. Appropriate dressings applied and the patient  was taken to ICU in satisfactory condition.         Fab Haas    D: 11/02/2017 17:44:57       T: 11/02/2017 18:02:12     [email or web address]  Job#: [id number]     Doc#: [id number]    CC:  Mart Cruz

## 2017-11-03 NOTE — PROGRESS NOTES
[facility]   Progress Note                 Date:   11/3/2017  Patient name:  Deyanira Spencer  Date of admission:  10/28/2017 10:42 PM  MRN:   [id number]  YOB: 1940  PCP:    Heriberto Salas MD    Reason for Admission: NSTEMI      Subjective:       Clinical Changes / Abnormalities:     Patient seen and examined. Sitting up in chair. Denies any complaints. Doing very well. D/W RN. No issues overnight. Medications:   Scheduled Meds:    metoprolol tartrate  12.5 mg Oral BID    docusate sodium  100 mg Oral BID    polyethylene glycol  17 g Oral Daily    enoxaparin  40 mg Subcutaneous Daily    celecoxib  100 mg Oral BID    pantoprazole  40 mg Oral QAM AC    chlorhexidine  15 mL Mouth/Throat BID    therapeutic multivitamin-minerals  1 tablet Oral Daily with breakfast    simvastatin  20 mg Oral Nightly    aspirin  150 mg Rectal Once    clopidogrel  75 mg Oral Daily    insulin lispro  0-6 Units Subcutaneous TID WC    insulin lispro  0-3 Units Subcutaneous Nightly     Continuous Infusions:    dextrose      dextrose       CBC:   Recent Labs      11/01/17 0522 11/02/17 0549  11/03/17   0448   WBC  11.2  10.9  11.6*   HGB  9.0*  7.9*  8.0*   PLT  155  145  162     BMP:    Recent Labs      10/31/17   2009  11/01/17   0126  11/01/17 0522 11/02/17   0549   NA   --    --   139  140   K   --   4.5  4.1  3.8   CL   --    --   107  104   CO2   --    --   20  27   BUN   --    --   14  15   CREATININE  0.53   --   0.63*  0.87   GLUCOSE   --    --   93  112*     Hepatic: No results for input(s): AST, ALT, ALB, BILITOT, ALKPHOS in the last 72 hours. Troponin: No results for input(s): TROPONINI in the last 72 hours. BNP: No results for input(s): BNP in the last 72 hours. Lipids: No results for input(s): CHOL, HDL in the last 72 hours. Invalid input(s): LDLCALCU  INR: No results for input(s): INR in the last 72 hours.     Objective:   Vitals: /67   Pulse 77   Temp 97.9 °F (36.6 °C) (Oral)   Resp 16   Ht 5' 11\" (1.803 m)   Wt 182 lb 9.6 oz (82.8 kg) Comment: per stand up scale  SpO2 95%   BMI 25.47 kg/m²   General appearance: Alert. No acute distress. HEENT: Head: Normal, normocephalic, atraumatic. Neck: Supple, no carotid bruit  Lungs: Clear to auscultation bilaterally with decreased respiratory effort. Heart: Regular rate and rhythm, S1, S2 normal, no murmurs  Abdomen: Soft, non-tender; bowel sounds normal  Extremities: No cyanosis or edema  Neurologic: No focal neurologic deficits  Mental status: Alert, oriented, mood appropriate      EKG: 10/31/2017  Sinus rhythm with 1st degree A-V block  Otherwise normal ECG      ECHO: 10/30/2017  Left ventricle is normal in size with low normal systolic function. Estimated ejection fraction is 50-55%. Grade I (mild) left ventricular diastolic dysfunction. Left atrium is normal in size. Mild increase in left atrial volume. Aortic sclerosis without stenosis. Mild mitral regurgitation. Trace tricuspid regurgitation. Estimated right ventricular systolic pressure is 26 mmHg. Cardiac Angiography: 10/30/2017  Left main: 60% ostial stenosis                    50% distal stenosis  LAD: 75% Proximal stenosis           80% Mid stenosis  LCX: Luminal ir regularities 20-30%. RCA: 80% Proximal stenosis            RPDA: 80% Ostial stenosis     The LV gram was performed in the FELIX 30 position. LVEF: 60%. LV Wall Motion: normal      10/31/2017:  OP CABG X 6, LIMA to LAD and D in seq, SVG to  OM1and OM2 in seq, SVG to PDA and PLV in seq      Assessment:   1. CAD s/p CABG x6 on 10/31  2. NSTEMI on presentation  3. 1st degree AV block  4. Acute blood loss anemia secondary to surgery  5. Acute post-op respiratory insufficiency      Plan / Recommendations:   1. Continue post-op management per CT surgery  2. Weaned off pressors and oxygen  3. Discontinued off PO Amiodarone  4. On Plavix 75, Zocor 20, Lopressor 12.5 bid  5.  Titrate

## 2017-11-04 VITALS
HEIGHT: 71 IN | SYSTOLIC BLOOD PRESSURE: 113 MMHG | HEART RATE: 80 BPM | BODY MASS INDEX: 25.68 KG/M2 | DIASTOLIC BLOOD PRESSURE: 58 MMHG | RESPIRATION RATE: 18 BRPM | WEIGHT: 183.42 LBS | OXYGEN SATURATION: 97 % | TEMPERATURE: 98.3 F

## 2017-11-04 LAB
HCT VFR BLD CALC: 23.5 % (ref 40.7–50.3)
HEMOGLOBIN: 7.3 G/DL (ref 13–17)
MAGNESIUM: 2.1 MG/DL (ref 1.6–2.6)
MCH RBC QN AUTO: 28.4 PG (ref 25.2–33.5)
MCHC RBC AUTO-ENTMCNC: 31.1 G/DL (ref 28.4–34.8)
MCV RBC AUTO: 91.4 FL (ref 82.6–102.9)
PDW BLD-RTO: 16.1 % (ref 11.8–14.4)
PLATELET # BLD: 186 K/UL (ref 138–453)
PMV BLD AUTO: 10.3 FL (ref 8.1–13.5)
RBC # BLD: 2.57 M/UL (ref 4.21–5.77)
WBC # BLD: 10.6 K/UL (ref 3.5–11.3)

## 2017-11-04 PROCEDURE — 83735 ASSAY OF MAGNESIUM: CPT

## 2017-11-04 PROCEDURE — 85027 COMPLETE CBC AUTOMATED: CPT

## 2017-11-04 PROCEDURE — 6370000000 HC RX 637 (ALT 250 FOR IP): Performed by: THORACIC SURGERY (CARDIOTHORACIC VASCULAR SURGERY)

## 2017-11-04 PROCEDURE — 6360000002 HC RX W HCPCS: Performed by: NURSE PRACTITIONER

## 2017-11-04 PROCEDURE — 6370000000 HC RX 637 (ALT 250 FOR IP): Performed by: NURSE PRACTITIONER

## 2017-11-04 PROCEDURE — 36415 COLL VENOUS BLD VENIPUNCTURE: CPT

## 2017-11-04 PROCEDURE — 6370000000 HC RX 637 (ALT 250 FOR IP): Performed by: INTERNAL MEDICINE

## 2017-11-04 PROCEDURE — 97110 THERAPEUTIC EXERCISES: CPT

## 2017-11-04 PROCEDURE — 97116 GAIT TRAINING THERAPY: CPT

## 2017-11-04 PROCEDURE — 94762 N-INVAS EAR/PLS OXIMTRY CONT: CPT

## 2017-11-04 RX ORDER — PSEUDOEPHEDRINE HCL 30 MG
100 TABLET ORAL 2 TIMES DAILY
Qty: 30 CAPSULE | Refills: 0 | Status: SHIPPED | OUTPATIENT
Start: 2017-11-04 | End: 2017-11-21 | Stop reason: ALTCHOICE

## 2017-11-04 RX ORDER — PANTOPRAZOLE SODIUM 40 MG/1
40 TABLET, DELAYED RELEASE ORAL
Qty: 30 TABLET | Refills: 3 | Status: SHIPPED | OUTPATIENT
Start: 2017-11-05 | End: 2018-07-11 | Stop reason: ALTCHOICE

## 2017-11-04 RX ORDER — ATORVASTATIN CALCIUM 40 MG/1
40 TABLET, FILM COATED ORAL NIGHTLY
Qty: 30 TABLET | Refills: 3 | Status: SHIPPED | OUTPATIENT
Start: 2017-11-04 | End: 2018-07-11 | Stop reason: ALTCHOICE

## 2017-11-04 RX ORDER — ATORVASTATIN CALCIUM 40 MG/1
40 TABLET, FILM COATED ORAL NIGHTLY
Status: DISCONTINUED | OUTPATIENT
Start: 2017-11-04 | End: 2017-11-04 | Stop reason: HOSPADM

## 2017-11-04 RX ORDER — LANOLIN ALCOHOL/MO/W.PET/CERES
325 CREAM (GRAM) TOPICAL 2 TIMES DAILY WITH MEALS
Status: DISCONTINUED | OUTPATIENT
Start: 2017-11-04 | End: 2017-11-04 | Stop reason: HOSPADM

## 2017-11-04 RX ORDER — FERROUS SULFATE 325(65) MG
325 TABLET ORAL 2 TIMES DAILY WITH MEALS
Qty: 30 TABLET | Refills: 3 | Status: SHIPPED | OUTPATIENT
Start: 2017-11-04 | End: 2017-11-21 | Stop reason: ALTCHOICE

## 2017-11-04 RX ORDER — OXYCODONE HYDROCHLORIDE 5 MG/1
5 TABLET ORAL EVERY 8 HOURS PRN
Qty: 30 TABLET | Refills: 0 | Status: SHIPPED | OUTPATIENT
Start: 2017-11-04 | End: 2017-11-11

## 2017-11-04 RX ADMIN — METOPROLOL TARTRATE 25 MG: 25 TABLET ORAL at 08:54

## 2017-11-04 RX ADMIN — OXYCODONE HYDROCHLORIDE 5 MG: 5 TABLET ORAL at 04:07

## 2017-11-04 RX ADMIN — ENOXAPARIN SODIUM 40 MG: 40 INJECTION SUBCUTANEOUS at 08:53

## 2017-11-04 RX ADMIN — MULTIPLE VITAMINS W/ MINERALS TAB 1 TABLET: TAB at 08:54

## 2017-11-04 RX ADMIN — ASPIRIN 81 MG: 81 TABLET, COATED ORAL at 08:54

## 2017-11-04 RX ADMIN — Medication 15 ML: at 08:53

## 2017-11-04 RX ADMIN — OXYCODONE HYDROCHLORIDE 5 MG: 5 TABLET ORAL at 08:52

## 2017-11-04 RX ADMIN — PANTOPRAZOLE SODIUM 40 MG: 40 TABLET, DELAYED RELEASE ORAL at 06:19

## 2017-11-04 RX ADMIN — CLOPIDOGREL 75 MG: 75 TABLET, FILM COATED ORAL at 08:54

## 2017-11-04 ASSESSMENT — PAIN SCALES - GENERAL
PAINLEVEL_OUTOF10: 2
PAINLEVEL_OUTOF10: 5

## 2017-11-04 ASSESSMENT — PAIN DESCRIPTION - ORIENTATION: ORIENTATION: MID

## 2017-11-04 ASSESSMENT — PAIN DESCRIPTION - LOCATION
LOCATION: STERNUM
LOCATION: INCISION

## 2017-11-04 ASSESSMENT — PAIN DESCRIPTION - PAIN TYPE
TYPE: SURGICAL PAIN
TYPE: SURGICAL PAIN

## 2017-11-04 NOTE — PROGRESS NOTES
[facility]  Progress Note    Surgeon: Jose G Davila  Procedure: CABG x 6   POD#: 4  EF: 50%      Subjective:  Mr. Ángel Fournier feels well today with no acute complaints. Pain is controlled. OOBTC and ambulating. Last BM yesterday. Good appetite. Walked the entire unit this morning. Denies chest pain or shortness of breath. Plan of care reviewed and questions answered. Physical Exam  Vitals:  Vitals:    11/04/17 0744   BP: (!) 113/58   Pulse: 80   Resp: 18   Temp: 98.3 °F (36.8 °C)   SpO2: 97%     I/O last 3 completed shifts: In: 540 [P.O.:540]  Out: 925 [Urine:925]  No intake/output data recorded. General: Alert and Oriented x3. Sitting up in bed. No apparent distress. HEENT:  Normocephalic and atraumatic. PERRL. EOMI. Lips and oral mucosa moist and without lesions. Neck:  Supple. Trachea midline. Chest:  No abnormality. Equal and symmetric expansion with respiration. Lungs: Clear throughout, slight crackles in right base  Cardiac:  Regular rate and rhythm without murmurs, rubs or gallops. Pacing wires  No  Abdomen:  Soft, non-tender, normoactive bowel sounds. No masses or organomegaly. Extremities:  No edema. Intact pulses in all four extremities. Musculoskeletal:  Intact range of motion of peripheral joints. grossly normal  Neurologic:  Cranial nerves are grossly intact. Non-focal sensory deficits on exam.  Psychiatric: Mood and affect are appropriate. Surgical Wounds: Surgical incisions with clean, dry, intact dressings in place.     Current Medications:    Current Facility-Administered Medications:     atorvastatin (LIPITOR) tablet 40 mg, 40 mg, Oral, Nightly, Geno Quan MD    ferrous sulfate EC tablet 325 mg, 325 mg, Oral, BID , Bernie Vaz MD    metoprolol tartrate (LOPRESSOR) tablet 25 mg, 25 mg, Oral, BID, Neelam Garcia MD, 25 mg at 11/04/17 0854    aspirin EC tablet 81 mg, 81 mg, Oral, Daily, Chuy Mosley CNP, 81 mg at 11/04/17 0854    potassium chloride (KLOR-CON M) extended release tablet 40 mEq, 40 mEq, Oral, PRN, 30 mEq at 11/02/17 0704 **OR** potassium chloride 20 MEQ/15ML (10%) oral solution 40 mEq, 40 mEq, Oral, PRN **OR** potassium chloride 10 mEq/100 mL IVPB (Peripheral Line), 10 mEq, Intravenous, PRN, Katherin Berg CNP    glucose (GLUTOSE) 40 % oral gel 15 g, 15 g, Oral, PRN, Armand Crespo MD    dextrose 50 % solution 12.5 g, 12.5 g, Intravenous, PRN, Armand Crespo MD    glucagon (rDNA) injection 1 mg, 1 mg, Intramuscular, PRN, Armand Crespo MD    dextrose 5 % solution, 100 mL/hr, Intravenous, PRN, Armand Crespo MD    docusate sodium (COLACE) capsule 100 mg, 100 mg, Oral, BID, Geroldine Brisker, CNP, 100 mg at 11/03/17 2106    polyethylene glycol (GLYCOLAX) packet 17 g, 17 g, Oral, Daily, Geroldine Brisker, CNP, 17 g at 11/03/17 0846    bisacodyl (DULCOLAX) suppository 10 mg, 10 mg, Rectal, Daily PRN, Geroldine Brisker, CNP, 10 mg at 11/03/17 0837    enoxaparin (LOVENOX) injection 40 mg, 40 mg, Subcutaneous, Daily, Geroldine Brisker, CNP, 40 mg at 11/04/17 0853    oxyCODONE (ROXICODONE) immediate release tablet 5 mg, 5 mg, Oral, Q4H PRN, 5 mg at 11/04/17 0852 **OR** oxyCODONE (ROXICODONE) immediate release tablet 10 mg, 10 mg, Oral, Q4H PRN, Geroldine Brisker, CNP, 10 mg at 11/02/17 1159    acetaminophen (OFIRMEV) infusion 1,000 mg, 1,000 mg, Intravenous, Q8H PRN, Geroldine Brisker, CNP, Last Rate: 400 mL/hr at 11/01/17 1654, 1,000 mg at 11/01/17 1654    pantoprazole (PROTONIX) tablet 40 mg, 40 mg, Oral, QAM ACArmand MD, 40 mg at 11/04/17 0619    sodium chloride flush 0.9 % injection 10 mL, 10 mL, Intravenous, PRN, Armand Crespo MD, 10 mL at 11/02/17 2101    calcium chloride 1 g in sodium chloride 0.9 % 100 mL IVPB, 1 g, Intravenous, PRNArmand MD    magnesium sulfate 1 g in dextrose 5% 100 mL IVPB, 1 g, Intravenous, PRNArmand MD, Stopped at 11/01/17 0909    potassium chloride 20 mEq/50 mL IVPB [employer]), 11/03/17   0448  11/04/17   0427   NA  140   --    --    K  3.8   --    --    CL  104   --    --    CO2  27   --    --    BUN  15   --    --    CREATININE  0.87   --    --    MG  2.1  2.0  2.1     Accucheck Glucoses:   Recent Labs      11/02/17   2045  11/03/17   0753  11/03/17   1104  11/03/17   1545  11/03/17   2128   POCGLU  144*  110  139*  115*  125*       No results for input(s): APTT in the last 72 hours. Assessment & Plan:   1. S/p CABG x 6   POD 4   Suppository today to promote BM, already on colace, miralax  2. ABLA - Hgb stable at 7.3,  hemodynamically stable, pt has been on Fe supplements at home due to ch anemia. 3. Post op pain    roxicodone being tolerated well.    Will d/c celebrex today and add asa back    DVT ppx: Lovenox & SCD's while stationary  Patient is on BB, Statin therapy per protocol   Plan for discharge home with home care today  Anemia of expected intra-op blood loss, hemo-dilution, pre existing condition, chronic disease    Latonya Prakash MD

## 2017-11-04 NOTE — PLAN OF CARE
Problem: Falls - Risk of:  Goal: Will remain free from falls  Will remain free from falls   Outcome: Ongoing      Problem: Pain:  Goal: Pain level will decrease  Pain level will decrease    Outcome: Ongoing  Pt c/o no pain at rest. Relived with medication    Problem: Pain:  Goal: Pain level will decrease  Pain level will decrease    Outcome: Ongoing  Pt c/o no pain at rest. Relived with medication

## 2017-11-04 NOTE — CARE COORDINATION
Discharge [facility] Case Management Department  Written by: Yumi Turk RN    Patient Name: Solomon Blake  Attending Provider: Brody Altman MD  Admit Date: 10/28/2017 10:42 PM  MRN: [id number]  Account: [de-identified]                     : 1940  Discharge Date: 17      Disposition: home with [facility] called mirian and faxed dc papers    Yumi Turk RN

## 2017-11-04 NOTE — CARE COORDINATION
Discharge plan home today with [facility] and [facility] papers faxed

## 2017-11-04 NOTE — PROGRESS NOTES
Physical Therapy  Facility/Department: [facility] CAR 1  Daily Treatment Note  NAME: Betty Field  : 1940  MRN: [id number]    Date of Service: 2017    Patient Diagnosis(es): There are no active problems to display for this patient. Past Medical History:   Diagnosis Date    Rheumatoid arteritis      Past Surgical History:   Procedure Laterality Date    CORONARY ARTERY BYPASS GRAFT N/A 10/31/2017    CORONARY ARTERY BYPASS GRAFT (OFF PUMP) X6: LIMA-LAD-DIAG, SVG-OM1-OM2, SVG-PDA-PLB; SWAN, ALIA PER ANESTHESIA performed by Bernie Vaz MD at [address] [place] [address] Right 2017    hip reeplacement        Restrictions  Restrictions/Precautions  Restrictions/Precautions: Cardiac, Surgical Protocols, Fall Risk  Required Braces or Orthoses?: No  Position Activity Restriction  Sternal Precautions: No Pushing, No Pulling, 5# Lifting Restrictions  Sternal Precautions: CABG X6 10/31/17  Other position/activity restrictions: Up with assist  Subjective   General  Chart Reviewed: Yes  Response To Previous Treatment: Patient with no complaints from previous session. Family / Caregiver Present: Yes  Subjective  Subjective: Pt up in chiar  General Comment  Comments: Only c/o was foot soreness from arthritis. Reports potential D/C today.           Orientation  Orientation  Overall Orientation Status: Within Normal Limits  Objective      Transfers  Sit to Stand: Stand by assistance  Stand to sit: Stand by assistance  Ambulation 1  Surface: level tile  Device: No Device  Assistance: Contact guard assistance  Quality of Gait: waddling gait, shortened step length bilaterally secondary to arthritic feet  Distance: 300 feet x1 (1 short rest break)  Comments: no LOB noted  Stairs/Curb  Stairs?: Yes  Stairs  # Steps : 10  Stairs Height: 6\"  Rails: Left ascending  Device: No Device  Assistance: Stand by assistance        Other exercises  Other exercises?: Yes  Other exercises 1: Seated Bilat UE and LE AROM ex's x20 reps per cardiac protocol                        Assessment   Body structures, Functions, Activity limitations: Decreased endurance;Decreased strength  Assessment: Pt completing functional mobility with SBA and displays no LOB during ambulation. Prognosis: Good  Patient Education: PT POC  Activity Tolerance  Activity Tolerance: Patient Tolerated treatment well  Activity Tolerance: No adverse reaction to activity. General fatigue with AMB       Discharge Recommendations:  Home with assist PRN    G-Code     OutComes Score                                                      Goals  Short term goals  Time Frame for Short term goals: 20 visits  Short term goal 1: Pt will be I with bed mobility  Short term goal 2: Pt will be I with transfers  Short term goal 3: Pt will be I with amb 300' or Otto with least restrictive AD  Short term goal 4: Pt will navigate 4 steps L rail Otto    Plan    Plan  Times per week: QD  Current Treatment Recommendations: Strengthening, ROM, Balance Training, Gait Training, Functional Mobility Training, Stair training, Endurance Training, Home Exercise Program, Safety Education & Training, Patient/Caregiver Education & Training, Equipment Evaluation, Education, & procurement  Safety Devices  Type of devices:  All fall risk precautions in place, Call light within reach, Left in chair  Restraints  Initially in place: No     Therapy Time   Individual Concurrent Group Co-treatment   Time In 1015         Time Out 1040         Minutes [address] [place] [address]

## 2017-11-04 NOTE — PROGRESS NOTES
[facility]   Progress Note                 Date:   11/4/2017  Patient name:  Herlinda Guadalupe  Date of admission:  10/28/2017 10:42 PM  MRN:   [id number]  YOB: 1940  PCP:    Minnie Bernstein MD    Reason for Admission: NSTEMI      Subjective:       Clinical Changes / Abnormalities: The patient is doing well and does not have significant chest pain. He has some cough. He has been having dark stools. Intake/Output Summary (Last 24 hours) at 11/04/17 0722  Last data filed at 11/04/17 2893   Gross per 24 hour   Intake              540 ml   Output              925 ml   Net             -385 ml         Medications:   Scheduled Meds:    atorvastatin  40 mg Oral Nightly    metoprolol tartrate  25 mg Oral BID    aspirin  81 mg Oral Daily    docusate sodium  100 mg Oral BID    polyethylene glycol  17 g Oral Daily    enoxaparin  40 mg Subcutaneous Daily    pantoprazole  40 mg Oral QAM AC    chlorhexidine  15 mL Mouth/Throat BID    therapeutic multivitamin-minerals  1 tablet Oral Daily with breakfast    clopidogrel  75 mg Oral Daily    insulin lispro  0-6 Units Subcutaneous TID WC    insulin lispro  0-3 Units Subcutaneous Nightly     Continuous Infusions:    dextrose      dextrose       CBC:   Recent Labs      11/02/17   0549  11/03/17   0448  11/04/17   0427   WBC  10.9  11.6*  10.6   HGB  7.9*  8.0*  7.3*   PLT  145  162  186     BMP:    Recent Labs      11/02/17   0549   NA  140   K  3.8   CL  104   CO2  27   BUN  15   CREATININE  0.87   GLUCOSE  112*     Hepatic: No results for input(s): AST, ALT, ALB, BILITOT, ALKPHOS in the last 72 hours. Troponin: No results for input(s): TROPONINI in the last 72 hours. BNP: No results for input(s): BNP in the last 72 hours. Lipids: No results for input(s): CHOL, HDL in the last 72 hours. Invalid input(s): LDLCALCU  INR: No results for input(s): INR in the last 72 hours.     Objective:   Vitals: BP (!) 100/51   Pulse 76 Temp 98.2 °F (36.8 °C) (Oral)   Resp 18   Ht 5' 11\" (1.803 m)   Wt 183 lb 6.8 oz (83.2 kg)   SpO2 92%   BMI 25.58 kg/m²   General appearance: Alert. No acute distress. HEENT: Head: Normal, normocephalic, atraumatic. Neck: Supple, no carotid bruit  Lungs: Clear to auscultation bilaterally with no rales or rhonchi  Heart: Regular rate and rhythm, S1, S2 normal, no murmurs  Abdomen: Soft, non-tender; bowel sounds normal  Extremities: No cyanosis or edema  Neurologic: No focal neurologic deficits  Mental status: Alert, oriented, mood appropriate      EKG: 10/31/2017  Sinus rhythm with 1st degree A-V block  Otherwise normal ECG      ECHO: 10/30/2017  Left ventricle is normal in size with low normal systolic function. Estimated ejection fraction is 50-55%. Grade I (mild) left ventricular diastolic dysfunction. Left atrium is normal in size. Mild increase in left atrial volume. Aortic sclerosis without stenosis. Mild mitral regurgitation. Trace tricuspid regurgitation. Estimated right ventricular systolic pressure is 26 mmHg. Cardiac Angiography: 10/30/2017  Left main: 60% ostial stenosis                    50% distal stenosis  LAD: 75% Proximal stenosis           80% Mid stenosis  LCX: Luminal ir regularities 20-30%. RCA: 80% Proximal stenosis            RPDA: 80% Ostial stenosis     The LV gram was performed in the FELIX 30 position. LVEF: 60%. LV Wall Motion: normal      10/31/2017:  OP CABG X 6, LIMA to LAD and D in seq, SVG to  OM1and OM2 in seq, SVG to PDA and PLV in seq      Assessment:   1. NSTEMI- Multivessel CAD s/p CABG x6 on 10/31-POD 4--LIMA to LAD and D in seq, SVG to  OM1and OM2 in seq, SVG to PDA and PLV in seq-On aspirin, Plavix 75, Zocor 20, Lopressor 25 mg bid  2. 1st degree AV block  3. Acute blood loss anemia secondary to surgery-Hgb dropped to 7.3 today from 8.0 yesterday        Plan / Recommendations:   1.  F/U H&H-Can consider consulting GI-Antiplatelet management as per CTS - Can consider holding plavix while continuing aspirin  2. BMP tomorrow  3. Change simvastatin to lipitor as per ACC/AHA guidelines      Electronically signed on 11/04/17 at 7:22 AM by:    Bryan Tidwell MD   Fellow, [address] [place] [address]  Attending Physician Statement  I have discussed the care of Mauro Johnson, including pertinent history and exam findings,  with the resident. I have seen and examined the patient and the key elements of all parts of the encounter have been performed by me. I agree with the assessment, plan and orders as documented by the resident.

## 2017-11-15 ENCOUNTER — OFFICE VISIT (OUTPATIENT)
Dept: CARDIOTHORACIC SURGERY | Age: 77
End: 2017-11-15

## 2017-11-15 ENCOUNTER — HOSPITAL ENCOUNTER (OUTPATIENT)
Dept: VASCULAR LAB | Age: 77
Discharge: HOME | End: 2017-11-15
Payer: MEDICARE

## 2017-11-15 VITALS
HEART RATE: 64 BPM | DIASTOLIC BLOOD PRESSURE: 67 MMHG | RESPIRATION RATE: 18 BRPM | SYSTOLIC BLOOD PRESSURE: 143 MMHG | HEIGHT: 71 IN | WEIGHT: 175 LBS | TEMPERATURE: 97.3 F | OXYGEN SATURATION: 98 % | BODY MASS INDEX: 24.5 KG/M2

## 2017-11-15 DIAGNOSIS — M79.661 PAIN AND SWELLING OF RIGHT LOWER LEG: Primary | ICD-10-CM

## 2017-11-15 DIAGNOSIS — Z95.1 S/P CABG X 6: ICD-10-CM

## 2017-11-15 DIAGNOSIS — L03.818 CELLULITIS OF OTHER SPECIFIED SITE: ICD-10-CM

## 2017-11-15 DIAGNOSIS — M79.89 PAIN AND SWELLING OF RIGHT LOWER LEG: ICD-10-CM

## 2017-11-15 DIAGNOSIS — M79.661 PAIN AND SWELLING OF RIGHT LOWER LEG: ICD-10-CM

## 2017-11-15 DIAGNOSIS — M79.89 PAIN AND SWELLING OF RIGHT LOWER LEG: Primary | ICD-10-CM

## 2017-11-15 PROCEDURE — 99024 POSTOP FOLLOW-UP VISIT: CPT | Performed by: NURSE PRACTITIONER

## 2017-11-15 PROCEDURE — 93971 EXTREMITY STUDY: CPT

## 2017-11-15 RX ORDER — ACETAMINOPHEN 650 MG
TABLET, EXTENDED RELEASE ORAL
Qty: 30 ML | Refills: 0 | Status: SHIPPED | OUTPATIENT
Start: 2017-11-15 | End: 2017-11-22

## 2017-11-15 RX ORDER — CEPHALEXIN 500 MG/1
500 CAPSULE ORAL 4 TIMES DAILY
Qty: 40 CAPSULE | Refills: 0 | Status: SHIPPED | OUTPATIENT
Start: 2017-11-15 | End: 2018-07-11 | Stop reason: ALTCHOICE

## 2017-11-15 RX ORDER — OXYCODONE HYDROCHLORIDE 5 MG/1
TABLET ORAL
Refills: 0 | COMMUNITY
Start: 2017-11-13 | End: 2018-07-11 | Stop reason: ALTCHOICE

## 2017-11-21 ENCOUNTER — OFFICE VISIT (OUTPATIENT)
Dept: CARDIOTHORACIC SURGERY | Age: 77
End: 2017-11-21

## 2017-11-21 VITALS
OXYGEN SATURATION: 97 % | BODY MASS INDEX: 24.64 KG/M2 | DIASTOLIC BLOOD PRESSURE: 73 MMHG | WEIGHT: 176 LBS | TEMPERATURE: 97.1 F | SYSTOLIC BLOOD PRESSURE: 147 MMHG | RESPIRATION RATE: 17 BRPM | HEART RATE: 66 BPM | HEIGHT: 71 IN

## 2017-11-21 DIAGNOSIS — Z51.89 VISIT FOR WOUND CHECK: Primary | ICD-10-CM

## 2017-11-21 PROCEDURE — 99024 POSTOP FOLLOW-UP VISIT: CPT | Performed by: THORACIC SURGERY (CARDIOTHORACIC VASCULAR SURGERY)

## 2017-12-04 NOTE — DISCHARGE SUMMARY
[facility]  Discharge Summary    Patient:  Louis Caudra  YOB: 1940  Surgeon: Lisandra Marquez MD    Admit date:10/28/2017    Discharge date: 11/4/2017     Admitting diagnosis:   · CAD    Discharge diagnosis:   · Same  · S/P CABG x 6  · ABLA    HPI: Louis Cuadra is a 76y.o. year old, ,  male who presented to ER in [facility] on 10/26/2017 with roughly 12 hours worth of chest pain. Pain came on at rest radiating to left  Shoulder and down left arm. Denies ever having this type of discomfort before. Pain relieved with ASA. Troponin elevation subsequently transferred to [facility] for further cardiac workup and possible intervention. Cardiac cath this morning revealed multivessel coronary artery disease including 60% ostial left main stenosis. LV gram shows an EF of 60%. We've been asked to evaluate for possible CABG. I'm seeing the patient in consult with Dr. Amita Khan. All films and records available have been reviewed. Procedures: OP CABG X 6, LIMA to LAD and D in seq, SVG to  OM1and OM2 in seq, SVG to PDA and PLV in seq, [address] Course: Mr. Kesha Sweet was taken to the OR on 10/31/2017 for the above procedure. Postoperatively he did well without significant complication. On 11/4/2017 AVSS incisions clean and intact voiding and eating without difficulty. Felt he coould be discharge to home. Follow up in the office in one week.     Discharge Medications:   Discharge Medication List as of 11/6/2017  6:40 AM           Details   oxyCODONE (ROXICODONE) 5 MG immediate release tablet Take 1 tablet by mouth every 8 hours as needed for Pain ., Disp-30 tablet, R-0Print      atorvastatin (LIPITOR) 40 MG tablet Take 1 tablet by mouth nightly, Disp-30 tablet, R-3Print      metoprolol tartrate (LOPRESSOR) 25 MG tablet Take 1 tablet by mouth 2 times daily, Disp-60 tablet, R-3Print      pantoprazole (PROTONIX) 40 MG tablet Take 1 tablet by mouth every morning (before breakfast), Disp-30 tablet, R-3Print      ferrous sulfate 325 (65 Fe) MG tablet Take 1 tablet by mouth 2 times daily (with meals), Disp-30 tablet, R-3Print      docusate sodium (COLACE, DULCOLAX) 100 MG CAPS Take 100 mg by mouth 2 times daily, Disp-30 capsule, R-0Print             Beta-Blocker: yes  ASA: yes, resumed as home med  Plavix: No  GI: yes  Statin: yes  Coumadin: no  ACE-I: no      Lita Ball PA-C  Discharging physician

## 2018-06-25 ENCOUNTER — TELEPHONE (OUTPATIENT)
Dept: CARDIOTHORACIC SURGERY | Age: 78
End: 2018-06-25

## 2018-07-11 ENCOUNTER — OFFICE VISIT (OUTPATIENT)
Dept: CARDIOTHORACIC SURGERY | Age: 78
End: 2018-07-11
Payer: MEDICARE

## 2018-07-11 VITALS
DIASTOLIC BLOOD PRESSURE: 86 MMHG | HEART RATE: 77 BPM | WEIGHT: 185 LBS | OXYGEN SATURATION: 98 % | BODY MASS INDEX: 25.9 KG/M2 | SYSTOLIC BLOOD PRESSURE: 161 MMHG | HEIGHT: 71 IN | TEMPERATURE: 97.6 F

## 2018-07-11 DIAGNOSIS — Z51.89 VISIT FOR WOUND CHECK: Primary | ICD-10-CM

## 2018-07-11 PROCEDURE — G8427 DOCREV CUR MEDS BY ELIG CLIN: HCPCS | Performed by: PHYSICIAN ASSISTANT

## 2018-07-11 PROCEDURE — G8419 CALC BMI OUT NRM PARAM NOF/U: HCPCS | Performed by: PHYSICIAN ASSISTANT

## 2018-07-11 PROCEDURE — G8427 DOCREV CUR MEDS BY ELIG CLIN: HCPCS | Performed by: THORACIC SURGERY (CARDIOTHORACIC VASCULAR SURGERY)

## 2018-07-11 PROCEDURE — G8419 CALC BMI OUT NRM PARAM NOF/U: HCPCS | Performed by: THORACIC SURGERY (CARDIOTHORACIC VASCULAR SURGERY)

## 2018-07-11 PROCEDURE — 99211 OFF/OP EST MAY X REQ PHY/QHP: CPT | Performed by: PHYSICIAN ASSISTANT

## 2018-07-16 NOTE — PROGRESS NOTES
Subjective:     Geovanny Magallon returns today status post CABG in November of 2017. Patient concerned about spot on right lower extremity where GSV was harvested. C/O odor and drainage. Denies fevers or chills. Scab came off. Objective:     BP (!) 161/86 (Site: Left Arm, Position: Sitting, Cuff Size: Medium Adult)   Pulse 77   Temp 97.6 °F (36.4 °C) (Oral)   Ht 5' 11\" (1.803 m)   Wt 185 lb (83.9 kg)   SpO2 98%   BMI 25.80 kg/m²   Chest: stable  CV: RRR no murmur  Lungs: clear  Lower extremities: 1+ edema  Saph incision: healed with less than 5mm spot that appears to have been a scab over previous suture knot. No drainage no erythema or warmth. No odor. There is venous insufficiency discolorization. Medications:     No current outpatient prescriptions on file prior to visit. No current facility-administered medications on file prior to visit. Assessment:     Tiny opened area mid calf GSV harvest. No signs of infection healed appropriately. Plan:     Wet to dry for one week. If any changes call. No follow up.     Janece Confer

## 2023-08-05 ENCOUNTER — HOSPITAL ENCOUNTER
Dept: HOSPITAL 101 - LAB | Age: 83
Discharge: HOME | End: 2023-08-05
Payer: MEDICARE

## 2023-08-05 DIAGNOSIS — E78.5: ICD-10-CM

## 2023-08-05 DIAGNOSIS — I10: ICD-10-CM

## 2023-08-05 DIAGNOSIS — N40.1: Primary | ICD-10-CM

## 2023-08-05 LAB
ADD MANUAL DIFF: NO
ALANINE AMINOTRANSFERASE: 17 U/L (ref 16–63)
ALBUMIN GLOBULIN RATIO: 0.7
ALBUMIN LEVEL: 3.1 G/DL (ref 3.4–5)
ALKALINE PHOSPHATASE: 147 U/L (ref 46–116)
ANION GAP: 9.2
ASPARTATE AMINO TRANSFERASE: 21 U/L (ref 15–37)
BLOOD UREA NITROGEN: 29 MG/DL (ref 7–18)
CALCIUM: 9.4 MG/DL (ref 8.5–10.1)
CARBON DIOXIDE: 29.4 MMOL/L (ref 21–32)
CHLORIDE: 103 MMOL/L (ref 98–107)
CHOLESTEROL: 103 MG/DL (ref ?–200)
CO2 BLD-SCNC: 29.4 MMOL/L (ref 21–32)
ESTIMATED GFR (AFRICAN AMERICA: >60 (ref 60–?)
ESTIMATED GFR (NON-AFRICAN AME: >60 (ref 60–?)
GLOBULIN: 4.7 G/DL
GLUCOSE BLD-MCNC: 100 MG/DL (ref 74–106)
HCT VFR BLD CALC: 41 % (ref 42–54)
HDL CHOLESTEROL: 37 MG/DL (ref 40–60)
HEMATOCRIT: 41 % (ref 42–54)
HEMOGLOBIN: 13.2 G/DL (ref 14–18)
IMMATURE GRANULOCYTES ABS AUTO: 0.05 10^3/UL (ref 0–0.03)
IMMATURE GRANULOCYTES PCT AUTO: 0.5 % (ref 0–0.5)
LYMPHOCYTES  ABSOLUTE AUTO: 4.1 10^3/UL (ref 1.2–3.8)
MCV RBC: 93.8 FL (ref 80–94)
MEAN CORPUSCULAR HEMOGLOBIN: 30.2 PG (ref 25.9–34)
MEAN CORPUSCULAR HGB CONC: 32.2 G/DL (ref 29.9–35.2)
MEAN CORPUSCULAR VOLUME: 93.8 FL (ref 80–94)
PLATELET # BLD: 300 10^3/UL (ref 150–450)
PLATELET COUNT: 300 10^3/UL (ref 150–450)
POTASSIUM SERPLBLD-SCNC: 4.6 MMOL/L (ref 3.5–5.1)
POTASSIUM: 4.6 MMOL/L (ref 3.5–5.1)
PROSTATE SPECIFIC ANTIGEN DX: 9.44 NG/ML (ref ?–4)
RED BLOOD COUNT: 4.37 10^6/UL (ref 4.7–6.1)
SODIUM BLD-SCNC: 137 MMOL/L (ref 136–145)
SODIUM: 137 MMOL/L (ref 136–145)
TOTAL PROTEIN: 7.8 G/DL (ref 6.4–8.2)
TRIGLYCERIDES: 75 MG/DL (ref ?–150)
VLDL CHOLESTEROL: 15 MG/DL
WBC # BLD: 10.7 10^3/UL (ref 4–11)
WHITE BLOOD COUNT: 10.7 10^3/UL (ref 4–11)

## 2023-08-05 PROCEDURE — 80061 LIPID PANEL: CPT

## 2023-08-05 PROCEDURE — 85025 COMPLETE CBC W/AUTO DIFF WBC: CPT

## 2023-08-05 PROCEDURE — 84153 ASSAY OF PSA TOTAL: CPT

## 2023-08-05 PROCEDURE — 80053 COMPREHEN METABOLIC PANEL: CPT

## 2023-08-05 PROCEDURE — 36415 COLL VENOUS BLD VENIPUNCTURE: CPT

## 2024-01-11 ENCOUNTER — HOSPITAL ENCOUNTER (OUTPATIENT)
Dept: HOSPITAL 101 - ER | Age: 84
Setting detail: OBSERVATION
LOS: 2 days | Discharge: HOME | End: 2024-01-13
Payer: MEDICARE

## 2024-01-11 VITALS
RESPIRATION RATE: 18 BRPM | OXYGEN SATURATION: 97 % | SYSTOLIC BLOOD PRESSURE: 165 MMHG | TEMPERATURE: 98 F | HEART RATE: 66 BPM | DIASTOLIC BLOOD PRESSURE: 61 MMHG

## 2024-01-11 VITALS
OXYGEN SATURATION: 97 % | RESPIRATION RATE: 16 BRPM | DIASTOLIC BLOOD PRESSURE: 80 MMHG | SYSTOLIC BLOOD PRESSURE: 159 MMHG | HEART RATE: 67 BPM

## 2024-01-11 VITALS — BODY MASS INDEX: 27.7 KG/M2 | BODY MASS INDEX: 6 KG/M2 | BODY MASS INDEX: 25.4 KG/M2

## 2024-01-11 VITALS
HEART RATE: 69 BPM | DIASTOLIC BLOOD PRESSURE: 79 MMHG | SYSTOLIC BLOOD PRESSURE: 159 MMHG | RESPIRATION RATE: 16 BRPM | OXYGEN SATURATION: 98 %

## 2024-01-11 VITALS
SYSTOLIC BLOOD PRESSURE: 135 MMHG | RESPIRATION RATE: 16 BRPM | TEMPERATURE: 97.8 F | OXYGEN SATURATION: 93 % | DIASTOLIC BLOOD PRESSURE: 72 MMHG | HEART RATE: 69 BPM

## 2024-01-11 VITALS
DIASTOLIC BLOOD PRESSURE: 80 MMHG | SYSTOLIC BLOOD PRESSURE: 144 MMHG | HEART RATE: 66 BPM | TEMPERATURE: 97.34 F | OXYGEN SATURATION: 98 % | RESPIRATION RATE: 22 BRPM

## 2024-01-11 VITALS — OXYGEN SATURATION: 92 %

## 2024-01-11 DIAGNOSIS — I25.10: ICD-10-CM

## 2024-01-11 DIAGNOSIS — N18.31: ICD-10-CM

## 2024-01-11 DIAGNOSIS — R26.2: ICD-10-CM

## 2024-01-11 DIAGNOSIS — M47.26: ICD-10-CM

## 2024-01-11 DIAGNOSIS — D72.829: ICD-10-CM

## 2024-01-11 DIAGNOSIS — E78.5: ICD-10-CM

## 2024-01-11 DIAGNOSIS — Z79.899: ICD-10-CM

## 2024-01-11 DIAGNOSIS — I12.9: ICD-10-CM

## 2024-01-11 DIAGNOSIS — M54.50: Primary | ICD-10-CM

## 2024-01-11 LAB
ADD MANUAL DIFF: NO
ALANINE AMINOTRANSFERASE: 18 U/L (ref 16–63)
ALBUMIN GLOBULIN RATIO: 0.5
ALBUMIN LEVEL: 2.8 G/DL (ref 3.4–5)
ALKALINE PHOSPHATASE: 101 U/L (ref 46–116)
ANION GAP: 9.5
ASPARTATE AMINO TRANSFERASE: 22 U/L (ref 15–37)
BLOOD UREA NITROGEN: 20 MG/DL (ref 7–18)
CALCIUM: 10 MG/DL (ref 8.5–10.1)
CARBON DIOXIDE: 31.2 MMOL/L (ref 21–32)
CAST SEEN?: (no result) #/LPF
CHLORIDE: 99 MMOL/L (ref 98–107)
CO2 BLD-SCNC: 31.2 MMOL/L (ref 21–32)
ESTIMATED GFR (AFRICAN AMERICA: >60 (ref 60–?)
ESTIMATED GFR (NON-AFRICAN AME: >60 (ref 60–?)
GLOBULIN: 5.2 G/DL
GLUCOSE BLD-MCNC: 101 MG/DL (ref 74–106)
GLUCOSE URINE UA: NEGATIVE MG/DL
HCT VFR BLD CALC: 43.9 % (ref 42–54)
HEMATOCRIT: 43.9 % (ref 42–54)
HEMOGLOBIN: 14.3 G/DL (ref 14–18)
IMMATURE GRANULOCYTES ABS AUTO: 0.07 10^3/UL (ref 0–0.03)
IMMATURE GRANULOCYTES PCT AUTO: 0.5 % (ref 0–0.5)
LACTATE/LACTIC ACID: 1.2 MMOL/L (ref 0.4–2)
LYMPHOCYTES  ABSOLUTE AUTO: 4.5 10^3/UL (ref 1.2–3.8)
MCV RBC: 93 FL (ref 80–94)
MEAN CORPUSCULAR HEMOGLOBIN: 30.3 PG (ref 25.9–34)
MEAN CORPUSCULAR HGB CONC: 32.6 G/DL (ref 29.9–35.2)
MEAN CORPUSCULAR VOLUME: 93 FL (ref 80–94)
PLATELET # BLD: 296 10^3/UL (ref 150–450)
PLATELET COUNT: 296 10^3/UL (ref 150–450)
POTASSIUM SERPLBLD-SCNC: 4.7 MMOL/L (ref 3.5–5.1)
POTASSIUM: 4.7 MMOL/L (ref 3.5–5.1)
RED BLOOD COUNT: 4.72 10^6/UL (ref 4.7–6.1)
SODIUM BLD-SCNC: 135 MMOL/L (ref 136–145)
SODIUM: 135 MMOL/L (ref 136–145)
TOTAL PROTEIN: 8 G/DL (ref 6.4–8.2)
WBC # BLD: 14.8 10^3/UL (ref 4–11)
WHITE BLOOD COUNT: 14.8 10^3/UL (ref 4–11)

## 2024-01-11 PROCEDURE — 97530 THERAPEUTIC ACTIVITIES: CPT

## 2024-01-11 PROCEDURE — 83605 ASSAY OF LACTIC ACID: CPT

## 2024-01-11 PROCEDURE — 74176 CT ABD & PELVIS W/O CONTRAST: CPT

## 2024-01-11 PROCEDURE — 93005 ELECTROCARDIOGRAM TRACING: CPT

## 2024-01-11 PROCEDURE — 96365 THER/PROPH/DIAG IV INF INIT: CPT

## 2024-01-11 PROCEDURE — 80076 HEPATIC FUNCTION PANEL: CPT

## 2024-01-11 PROCEDURE — 96376 TX/PRO/DX INJ SAME DRUG ADON: CPT

## 2024-01-11 PROCEDURE — 97161 PT EVAL LOW COMPLEX 20 MIN: CPT

## 2024-01-11 PROCEDURE — 97110 THERAPEUTIC EXERCISES: CPT

## 2024-01-11 PROCEDURE — 73502 X-RAY EXAM HIP UNI 2-3 VIEWS: CPT

## 2024-01-11 PROCEDURE — 85027 COMPLETE CBC AUTOMATED: CPT

## 2024-01-11 PROCEDURE — 94761 N-INVAS EAR/PLS OXIMETRY MLT: CPT

## 2024-01-11 PROCEDURE — 94667 MNPJ CHEST WALL 1ST: CPT

## 2024-01-11 PROCEDURE — 87086 URINE CULTURE/COLONY COUNT: CPT

## 2024-01-11 PROCEDURE — G0378 HOSPITAL OBSERVATION PER HR: HCPCS

## 2024-01-11 PROCEDURE — 96375 TX/PRO/DX INJ NEW DRUG ADDON: CPT

## 2024-01-11 PROCEDURE — 80053 COMPREHEN METABOLIC PANEL: CPT

## 2024-01-11 PROCEDURE — 81001 URINALYSIS AUTO W/SCOPE: CPT

## 2024-01-11 PROCEDURE — 84481 FREE ASSAY (FT-3): CPT

## 2024-01-11 PROCEDURE — 84484 ASSAY OF TROPONIN QUANT: CPT

## 2024-01-11 PROCEDURE — 36415 COLL VENOUS BLD VENIPUNCTURE: CPT

## 2024-01-11 PROCEDURE — 99285 EMERGENCY DEPT VISIT HI MDM: CPT

## 2024-01-11 PROCEDURE — 97165 OT EVAL LOW COMPLEX 30 MIN: CPT

## 2024-01-11 PROCEDURE — 94668 MNPJ CHEST WALL SBSQ: CPT

## 2024-01-11 PROCEDURE — 96366 THER/PROPH/DIAG IV INF ADDON: CPT

## 2024-01-11 PROCEDURE — 80048 BASIC METABOLIC PNL TOTAL CA: CPT

## 2024-01-11 PROCEDURE — 85025 COMPLETE CBC W/AUTO DIFF WBC: CPT

## 2024-01-11 PROCEDURE — 71045 X-RAY EXAM CHEST 1 VIEW: CPT

## 2024-01-11 PROCEDURE — 72100 X-RAY EXAM L-S SPINE 2/3 VWS: CPT

## 2024-01-11 RX ADMIN — CARVEDILOL 6.25 MG: 6.25 TABLET, FILM COATED ORAL at 21:57

## 2024-01-11 RX ADMIN — METHYLPREDNISOLONE SODIUM SUCCINATE 125 MG: 125 INJECTION, POWDER, FOR SOLUTION INTRAMUSCULAR; INTRAVENOUS at 21:57

## 2024-01-11 RX ADMIN — MORPHINE SULFATE 4 MG: 4 INJECTION INTRAVENOUS at 17:16

## 2024-01-11 RX ADMIN — MORPHINE SULFATE 2 MG: 4 INJECTION INTRAVENOUS at 14:15

## 2024-01-11 RX ADMIN — TRAMADOL HYDROCHLORIDE 50 MG: 50 TABLET, COATED ORAL at 21:57

## 2024-01-12 VITALS
TEMPERATURE: 98.2 F | HEART RATE: 73 BPM | OXYGEN SATURATION: 92 % | SYSTOLIC BLOOD PRESSURE: 123 MMHG | RESPIRATION RATE: 18 BRPM | DIASTOLIC BLOOD PRESSURE: 68 MMHG

## 2024-01-12 VITALS
RESPIRATION RATE: 18 BRPM | TEMPERATURE: 97.88 F | HEART RATE: 68 BPM | OXYGEN SATURATION: 94 % | SYSTOLIC BLOOD PRESSURE: 127 MMHG | DIASTOLIC BLOOD PRESSURE: 65 MMHG

## 2024-01-12 VITALS
HEART RATE: 62 BPM | SYSTOLIC BLOOD PRESSURE: 143 MMHG | OXYGEN SATURATION: 90 % | DIASTOLIC BLOOD PRESSURE: 68 MMHG | TEMPERATURE: 97.88 F | RESPIRATION RATE: 18 BRPM

## 2024-01-12 VITALS — OXYGEN SATURATION: 96 %

## 2024-01-12 VITALS — SYSTOLIC BLOOD PRESSURE: 178 MMHG | DIASTOLIC BLOOD PRESSURE: 83 MMHG

## 2024-01-12 VITALS — OXYGEN SATURATION: 94 %

## 2024-01-12 LAB
ADD MANUAL DIFF: NO
ALANINE AMINOTRANSFERASE: 20 U/L (ref 16–63)
ALBUMIN GLOBULIN RATIO: 0.5
ALBUMIN LEVEL: 2.6 G/DL (ref 3.4–5)
ALKALINE PHOSPHATASE: 96 U/L (ref 46–116)
ANION GAP: 15
ASPARTATE AMINO TRANSFERASE: 20 U/L (ref 15–37)
BLOOD UREA NITROGEN: 24 MG/DL (ref 7–18)
CALCIUM: 9.6 MG/DL (ref 8.5–10.1)
CARBON DIOXIDE: 26.5 MMOL/L (ref 21–32)
CHLORIDE: 98 MMOL/L (ref 98–107)
CO2 BLD-SCNC: 26.5 MMOL/L (ref 21–32)
ESTIMATED GFR (AFRICAN AMERICA: >60 (ref 60–?)
ESTIMATED GFR (NON-AFRICAN AME: 57 (ref 60–?)
FREE T3: 1.84 PG/ML (ref 2.18–3.98)
GLOBULIN: 5.4 G/DL
GLUCOSE BLD-MCNC: 127 MG/DL (ref 74–106)
HCT VFR BLD CALC: 44.7 % (ref 42–54)
HEMATOCRIT: 44.7 % (ref 42–54)
HEMOGLOBIN: 14.2 G/DL (ref 14–18)
IMMATURE GRANULOCYTES ABS AUTO: 0.05 10^3/UL (ref 0–0.03)
IMMATURE GRANULOCYTES PCT AUTO: 0.5 % (ref 0–0.5)
LYMPHOCYTES  ABSOLUTE AUTO: 2.7 10^3/UL (ref 1.2–3.8)
MCV RBC: 92.5 FL (ref 80–94)
MEAN CORPUSCULAR HEMOGLOBIN: 29.4 PG (ref 25.9–34)
MEAN CORPUSCULAR HGB CONC: 31.8 G/DL (ref 29.9–35.2)
MEAN CORPUSCULAR VOLUME: 92.5 FL (ref 80–94)
PLATELET # BLD: 314 10^3/UL (ref 150–450)
PLATELET COUNT: 314 10^3/UL (ref 150–450)
POTASSIUM SERPLBLD-SCNC: 4.5 MMOL/L (ref 3.5–5.1)
POTASSIUM: 4.5 MMOL/L (ref 3.5–5.1)
RED BLOOD COUNT: 4.83 10^6/UL (ref 4.7–6.1)
SODIUM BLD-SCNC: 135 MMOL/L (ref 136–145)
SODIUM: 135 MMOL/L (ref 136–145)
TOTAL PROTEIN: 8 G/DL (ref 6.4–8.2)
WBC # BLD: 9.8 10^3/UL (ref 4–11)
WHITE BLOOD COUNT: 9.8 10^3/UL (ref 4–11)

## 2024-01-12 RX ADMIN — ACETAMINOPHEN 1000 MG: 500 TABLET ORAL at 08:23

## 2024-01-12 RX ADMIN — TRAMADOL HYDROCHLORIDE 50 MG: 50 TABLET, COATED ORAL at 21:15

## 2024-01-12 RX ADMIN — TRAMADOL HYDROCHLORIDE 50 MG: 50 TABLET, COATED ORAL at 14:42

## 2024-01-12 RX ADMIN — METHYLPREDNISOLONE SODIUM SUCCINATE 125 MG: 125 INJECTION, POWDER, FOR SOLUTION INTRAMUSCULAR; INTRAVENOUS at 08:23

## 2024-01-12 RX ADMIN — SPIRONOLACTONE 25 MG: 25 TABLET, FILM COATED ORAL at 08:23

## 2024-01-12 RX ADMIN — TRAMADOL HYDROCHLORIDE 50 MG: 50 TABLET, COATED ORAL at 08:24

## 2024-01-12 RX ADMIN — ACETAMINOPHEN 1000 MG: 500 TABLET ORAL at 21:15

## 2024-01-12 RX ADMIN — LOSARTAN POTASSIUM 50 MG: 50 TABLET, FILM COATED ORAL at 08:23

## 2024-01-12 RX ADMIN — CARVEDILOL 6.25 MG: 6.25 TABLET, FILM COATED ORAL at 21:14

## 2024-01-12 RX ADMIN — ACETAMINOPHEN 1000 MG: 500 TABLET ORAL at 14:42

## 2024-01-12 RX ADMIN — CARVEDILOL 6.25 MG: 6.25 TABLET, FILM COATED ORAL at 08:23

## 2024-01-12 RX ADMIN — METHYLPREDNISOLONE SODIUM SUCCINATE 125 MG: 125 INJECTION, POWDER, FOR SOLUTION INTRAMUSCULAR; INTRAVENOUS at 14:39

## 2024-01-12 RX ADMIN — METHYLPREDNISOLONE SODIUM SUCCINATE 125 MG: 125 INJECTION, POWDER, FOR SOLUTION INTRAMUSCULAR; INTRAVENOUS at 01:28

## 2024-01-12 RX ADMIN — METHYLPREDNISOLONE SODIUM SUCCINATE 125 MG: 125 INJECTION, POWDER, FOR SOLUTION INTRAMUSCULAR; INTRAVENOUS at 21:14

## 2024-01-13 VITALS
OXYGEN SATURATION: 94 % | RESPIRATION RATE: 18 BRPM | DIASTOLIC BLOOD PRESSURE: 73 MMHG | SYSTOLIC BLOOD PRESSURE: 145 MMHG | HEART RATE: 61 BPM | TEMPERATURE: 97.34 F

## 2024-01-13 VITALS
SYSTOLIC BLOOD PRESSURE: 119 MMHG | RESPIRATION RATE: 18 BRPM | HEART RATE: 63 BPM | DIASTOLIC BLOOD PRESSURE: 63 MMHG | TEMPERATURE: 97.34 F | OXYGEN SATURATION: 95 %

## 2024-01-13 VITALS — OXYGEN SATURATION: 95 %

## 2024-01-13 VITALS — OXYGEN SATURATION: 93 %

## 2024-01-13 VITALS — RESPIRATION RATE: 18 BRPM | HEART RATE: 60 BPM

## 2024-01-13 VITALS — DIASTOLIC BLOOD PRESSURE: 73 MMHG | SYSTOLIC BLOOD PRESSURE: 145 MMHG

## 2024-01-13 LAB
ADD MANUAL DIFF: YES
ALANINE AMINOTRANSFERASE: 15 U/L (ref 16–63)
ALBUMIN GLOBULIN RATIO: 0.5
ALBUMIN LEVEL: 2.5 G/DL (ref 3.4–5)
ALKALINE PHOSPHATASE: 86 U/L (ref 46–116)
ANION GAP: 13.7
ASPARTATE AMINO TRANSFERASE: 15 U/L (ref 15–37)
ATYPICAL LYMPHOCYTES % MANUAL: 3 %
ATYPICAL LYMPHOCYTES ABS MAN: 0.94
BASOPHILS ABS MANUAL: 0 10^3/UL (ref 0–0.1)
BASOPHILS NFR SPEC MANUAL: 0 % (ref 0.2–2)
BLOOD UREA NITROGEN: 46 MG/DL (ref 7–18)
CALCIUM: 9.4 MG/DL (ref 8.5–10.1)
CARBON DIOXIDE: 25.5 MMOL/L (ref 21–32)
CHLORIDE: 96 MMOL/L (ref 98–107)
CO2 BLD-SCNC: 25.5 MMOL/L (ref 21–32)
EOSINOPHILS ABSOLUTE MANUAL: 0 10^3/UL (ref 0–0.7)
EOSINOPHILS PERCENT MANUAL: 0 % (ref 0.9–7)
ESTIMATED GFR (AFRICAN AMERICA: 57 (ref 60–?)
ESTIMATED GFR (NON-AFRICAN AME: 47 (ref 60–?)
GLOBULIN: 5.3 G/DL
GLUCOSE BLD-MCNC: 162 MG/DL (ref 74–106)
HCT VFR BLD CALC: 40.4 % (ref 42–54)
HEMATOCRIT: 40.4 % (ref 42–54)
HEMOGLOBIN: 13.2 G/DL (ref 14–18)
LYMPHOCYTES ABSOLUTE MANUAL: 2.51 10^3/UL (ref 1.2–3.8)
LYMPHOCYTES PERCENT MANUAL: 8 % (ref 20.5–60)
MCV RBC: 91.8 FL (ref 80–94)
MEAN CORPUSCULAR HEMOGLOBIN: 30 PG (ref 25.9–34)
MEAN CORPUSCULAR HGB CONC: 32.7 G/DL (ref 29.9–35.2)
MEAN CORPUSCULAR VOLUME: 91.8 FL (ref 80–94)
MONOCYTES ABSOLUTE MANUAL: 0.31 10^3/UL (ref 0.3–0.8)
MONOCYTES PERCENT MANUAL: 1 % (ref 1.7–12)
PLATELET # BLD: 319 10^3/UL (ref 150–450)
PLATELET COUNT: 319 10^3/UL (ref 150–450)
POTASSIUM SERPLBLD-SCNC: 4.2 MMOL/L (ref 3.5–5.1)
POTASSIUM: 4.2 MMOL/L (ref 3.5–5.1)
RED BLOOD COUNT: 4.4 10^6/UL (ref 4.7–6.1)
SEGMENTED NEUT ABSOLUTE MANUAL: 27.63 10^3/UL (ref 1.4–6.5)
SEGMENTED NEUTROPHILS % MANUAL: 88
SODIUM BLD-SCNC: 131 MMOL/L (ref 136–145)
SODIUM: 131 MMOL/L (ref 136–145)
TOTAL PROTEIN: 7.8 G/DL (ref 6.4–8.2)
TOXIC GRANULATION: (no result)
TOXIC GRANULES BLD QL SMEAR: (no result)
WBC # BLD: 31.4 10^3/UL (ref 4–11)
WHITE BLOOD COUNT: 31.4 10^3/UL (ref 4–11)

## 2024-01-13 RX ADMIN — METHYLPREDNISOLONE SODIUM SUCCINATE 125 MG: 125 INJECTION, POWDER, FOR SOLUTION INTRAMUSCULAR; INTRAVENOUS at 01:42

## 2024-01-13 RX ADMIN — SPIRONOLACTONE 25 MG: 25 TABLET, FILM COATED ORAL at 08:12

## 2024-01-13 RX ADMIN — LOSARTAN POTASSIUM 50 MG: 50 TABLET, FILM COATED ORAL at 08:12

## 2024-01-13 RX ADMIN — ACETAMINOPHEN 1000 MG: 500 TABLET ORAL at 08:19

## 2024-01-13 RX ADMIN — METHYLPREDNISOLONE SODIUM SUCCINATE 125 MG: 125 INJECTION, POWDER, FOR SOLUTION INTRAMUSCULAR; INTRAVENOUS at 08:12

## 2024-01-13 RX ADMIN — CARVEDILOL 6.25 MG: 6.25 TABLET, FILM COATED ORAL at 08:12

## 2024-01-13 RX ADMIN — TRAMADOL HYDROCHLORIDE 50 MG: 50 TABLET, COATED ORAL at 09:35

## 2024-06-20 ENCOUNTER — HOSPITAL ENCOUNTER
Dept: HOSPITAL 101 - LAB | Age: 84
Discharge: HOME | End: 2024-06-20
Payer: MEDICARE

## 2024-06-20 DIAGNOSIS — I10: ICD-10-CM

## 2024-06-20 DIAGNOSIS — I25.10: ICD-10-CM

## 2024-06-20 DIAGNOSIS — I50.32: ICD-10-CM

## 2024-06-20 DIAGNOSIS — E78.5: Primary | ICD-10-CM

## 2024-06-20 LAB
ADD MANUAL DIFF: YES
ALANINE AMINOTRANSFERASE: 27 U/L (ref 16–63)
ALBUMIN GLOBULIN RATIO: 0.7
ALBUMIN LEVEL: 3.2 G/DL (ref 3.4–5)
ALKALINE PHOSPHATASE: 101 U/L (ref 46–116)
ANION GAP: 12.4
ASPARTATE AMINO TRANSFERASE: 27 U/L (ref 15–37)
ATYPICAL LYMPHOCYTES % MANUAL: 3 %
ATYPICAL LYMPHOCYTES ABS MAN: 0.37
BAND NEUTROPHILS ABSOLUTE: 0.1 10^3/UL (ref 0–0.3)
BASOPHILS ABS MANUAL: 0 10^3/UL (ref 0–0.1)
BASOPHILS NFR SPEC MANUAL: 0 % (ref 0.2–2)
BLOOD UREA NITROGEN: 25 MG/DL (ref 7–18)
CALCIUM: 9.7 MG/DL (ref 8.5–10.1)
CARBON DIOXIDE: 30.1 MMOL/L (ref 21–32)
CHLORIDE: 101 MMOL/L (ref 98–107)
CHOLESTEROL: 204 MG/DL (ref ?–200)
EOSINOPHILS ABSOLUTE MANUAL: 0 10^3/UL (ref 0–0.7)
EOSINOPHILS PERCENT MANUAL: 0 % (ref 0.9–7)
ESTIMATED GFR (AFRICAN AMERICA: >60 (ref 60–?)
ESTIMATED GFR (NON-AFRICAN AME: 56 (ref 60–?)
GLOBULIN: 4.9 G/DL
GLUCOSE: 95 MG/DL (ref 74–106)
HDL CHOLESTEROL: 40 MG/DL (ref 40–60)
HEMATOCRIT: 40.8 % (ref 42–54)
HEMOGLOBIN: 13.2 G/DL (ref 14–18)
LYMPHOCYTES ABSOLUTE MANUAL: 5.29 10^3/UL (ref 1.2–3.8)
LYMPHOCYTES PERCENT MANUAL: 42 % (ref 20.5–60)
MCV RBC: 94.9 FL (ref 80–94)
MEAN CORPUSCULAR HEMOGLOBIN: 30.7 PG (ref 25.9–34)
MEAN CORPUSCULAR HGB CONC: 32.4 G/DL (ref 29.9–35.2)
MONOCYTES ABSOLUTE MANUAL: 0.5 10^3/UL (ref 0.3–0.8)
MONOCYTES PERCENT MANUAL: 4 % (ref 1.7–12)
PLATELET COUNT: 321 10^3/UL (ref 150–450)
POTASSIUM: 4.5 MMOL/L (ref 3.5–5.1)
RED BLOOD COUNT: 4.3 10^6/UL (ref 4.7–6.1)
SEGMENTED NEUT ABSOLUTE MANUAL: 6.3 10^3/UL (ref 1.4–6.5)
SEGMENTED NEUTROPHILS % MANUAL: 50
SODIUM: 139 MMOL/L (ref 136–145)
TOTAL PROTEIN: 8.1 G/DL (ref 6.4–8.2)
TRIGLYCERIDES: 161 MG/DL (ref ?–150)
VLDL CHOLESTEROL: 32.2 MG/DL
WHITE BLOOD COUNT: 12.6 10^3/UL (ref 4–11)

## 2024-06-20 PROCEDURE — 36415 COLL VENOUS BLD VENIPUNCTURE: CPT

## 2024-06-20 PROCEDURE — 80053 COMPREHEN METABOLIC PANEL: CPT

## 2024-06-20 PROCEDURE — 80061 LIPID PANEL: CPT

## 2024-06-20 PROCEDURE — 85007 BL SMEAR W/DIFF WBC COUNT: CPT

## 2024-06-20 PROCEDURE — 85027 COMPLETE CBC AUTOMATED: CPT

## 2025-06-17 ENCOUNTER — HOSPITAL ENCOUNTER
Age: 85
Discharge: HOME | End: 2025-06-17
Payer: MEDICARE

## 2025-06-17 DIAGNOSIS — I11.0: Primary | ICD-10-CM

## 2025-06-17 DIAGNOSIS — I50.32: ICD-10-CM

## 2025-06-17 DIAGNOSIS — M51.369: ICD-10-CM

## 2025-06-17 DIAGNOSIS — M85.80: ICD-10-CM

## 2025-06-17 DIAGNOSIS — M54.50: ICD-10-CM

## 2025-06-17 DIAGNOSIS — M06.9: ICD-10-CM

## 2025-06-17 DIAGNOSIS — E78.2: ICD-10-CM

## 2025-06-17 DIAGNOSIS — I25.10: ICD-10-CM

## 2025-06-17 LAB
ADD MANUAL DIFF: NO
ALANINE AMINOTRANSFERASE: 25 U/L (ref 16–63)
ALBUMIN GLOBULIN RATIO: 0.8
ALBUMIN LEVEL: 3.5 G/DL (ref 3.4–5)
ALKALINE PHOSPHATASE: 77 U/L (ref 46–116)
ANION GAP: 12.4
ASPARTATE AMINO TRANSFERASE: 26 U/L (ref 15–37)
BASOPHILS # BLD AUTO: 0.1 10^3/UL (ref 0–0.1)
BASOPHILS NFR BLD AUTO: 0.5 % (ref 0.2–2)
BILIRUBIN TOTAL: 0.9 MG/DL (ref 0.2–1)
BILIRUBIN URINE: NEGATIVE
BUN SERPL-MCNC: 24 MG/DL (ref 7–18)
BUN/CREAT SERPL: 19.5
CALCIUM: 9.6 MG/DL (ref 8.5–10.1)
CHLORIDE: 103 MMOL/L (ref 98–107)
CHOL HDL RATIO: 5.3
CHOLESTEROL: 238 MG/DL (ref ?–200)
CLARITY UR: CLEAR
CO2 BLD-SCNC: 29.9 MMOL/L (ref 21–32)
COLOR UR: YELLOW
CREAT SERPL-SCNC: 1.23 MG/DL (ref 0.7–1.3)
CREATININE URINE RANDOM: 110.25 MG/DL (ref 20–300)
EOSINOPHIL # BLD AUTO: 0.2 10^3/UL (ref 0–0.7)
EOSINOPHIL NFR BLD AUTO: 2.2 % (ref 0.9–7)
ERYTHROCYTE [DISTWIDTH] IN BLOOD BY AUTOMATED COUNT: 13.6 % (ref 11–15)
ESTIMATED GFR (AFRICAN AMERICA: >60
ESTIMATED GFR (NON-AFRICAN AME: 56
GLOBULIN: 4.3 G/DL
GLUCOSE SERPLBLD-MCNC: 94 MG/DL (ref 74–106)
GLUCOSE URINE UA: NEGATIVE MG/DL
HCT VFR BLD CALC: 45.3 % (ref 42–54)
HDL CHOLESTEROL: 45 MG/DL (ref 40–60)
HEMOGLOBIN: 15.1 G/DL (ref 14–18)
HGB UR QL: NEGATIVE
IMMATURE GRANULOCYTES ABS AUTO: 0.05 10^3/UL (ref 0–0.03)
IMMATURE GRANULOCYTES PCT AUTO: 0.5 % (ref 0–0.5)
KETONES URINE: NEGATIVE MG/DL
LDL CHOLESTEROL CALCULATED: 160 MG/DL
LEUKOCYTE ESTERASE UR QL: NEGATIVE
LYMPHOCYTES  ABSOLUTE AUTO: 5.6 10^3/UL (ref 1.2–3.8)
LYMPHOCYTES PERCENT AUTO: 54.6 % (ref 20.5–60)
Lab: NO
MCH RBC QN AUTO: 32.5 PG (ref 25.9–34)
MCV RBC: 97.4 FL (ref 80–94)
MEAN CORPUSCULAR HGB CONC: 33.3 G/DL (ref 29.9–35.2)
MEAN PLATELET VOLUME: 9 FL (ref 9.5–13.5)
MICROALBUM CREATININE RATIO UR: (no result) MG/G (ref 0–29.9)
MICROALBUMIN URINE RANDOM: <1.3 MG/DL (ref ?–30)
MONOCYTES # BLD AUTO: 1.2 10^3/UL (ref 0.3–0.8)
MONOCYTES NFR BLD AUTO: 11.6 % (ref 1.7–12)
NEUTROPHILS # BLD AUTO: 3.1 10^3/UL (ref 1.4–6.5)
NEUTROPHILS NFR BLD AUTO: 30.6 % (ref 43–75)
NITRITE URINE: NEGATIVE
PH UR: 7 [PH] (ref 5–9)
PLATELET # BLD: 250 10^3/UL (ref 150–450)
POTASSIUM SERPLBLD-SCNC: 4.3 MMOL/L (ref 3.5–5.1)
PROTEIN URINE: NEGATIVE MG/DL
RBC # BLD AUTO: 4.65 10^6/UL (ref 4.7–6.1)
SODIUM BLD-SCNC: 141 MMOL/L (ref 136–145)
SPECIFIC GRAVITY URINE: 1.01 (ref 1–1.02)
TOTAL PROTEIN: 7.8 G/DL (ref 6.4–8.2)
TRIGLYCERIDES: 168 MG/DL (ref ?–150)
UROBILINOGEN UR QL: 1 EU/DL (ref 0.2–1)
VLDL CHOLESTEROL: 33.6 MG/DL
WBC # BLD: 10.2 10^3/UL (ref 4–11)

## 2025-06-17 PROCEDURE — 81003 URINALYSIS AUTO W/O SCOPE: CPT

## 2025-06-17 PROCEDURE — 82043 UR ALBUMIN QUANTITATIVE: CPT

## 2025-06-17 PROCEDURE — 80053 COMPREHEN METABOLIC PANEL: CPT

## 2025-06-17 PROCEDURE — 72100 X-RAY EXAM L-S SPINE 2/3 VWS: CPT

## 2025-06-17 PROCEDURE — 36415 COLL VENOUS BLD VENIPUNCTURE: CPT

## 2025-06-17 PROCEDURE — 80061 LIPID PANEL: CPT

## 2025-06-17 PROCEDURE — 82570 ASSAY OF URINE CREATININE: CPT

## (undated) DEVICE — Device

## (undated) DEVICE — SUTURE VCRL + SZ 3-0 L27IN ABSRB UD L26MM SH 1/2 CIR VCP416H

## (undated) DEVICE — WAX SURG 2.5GM HEMSTAT BNE BEESWAX PARAFFIN ISO PALMITATE

## (undated) DEVICE — CHLORAPREP 26ML ORANGE

## (undated) DEVICE — SHUNT SURG INTRALUMINAL 1X18 MM SIL STRL FLO-THRU LF DISP

## (undated) DEVICE — Z DISCONTINUED USE 2275676 GLOVE SURG SZ 65 L12IN FNGR THK87MIL DK GRN LTX FREE ISOLEX

## (undated) DEVICE — Device: Brand: VIRTUOSAPH PLUS WITH RADIAL INDICATION

## (undated) DEVICE — PUNCH AORT DIA4MM LNG HNDL

## (undated) DEVICE — Z DISCONTINUED NO SUB IDED DRAIN SURG 2 COLL PT TB FOR ATS BG OASIS

## (undated) DEVICE — SCANLAN® VASCU-STATT® II PLUS S-USE BULLDOG CLAMP W/FIRM PRESS GENTLE-JAW™- MINI STRAIGHT (GREEN), CLAMPING PRESSURE 20-25 G (1/STERILE PKG): Brand: SCANLAN® VASCU-STATT® II PLUS S-USE BULLDOG CLAMP W/FIRM PRESS GENTLE-JAW™

## (undated) DEVICE — GOWN,AURORA,NONREINFORCED,LARGE: Brand: MEDLINE

## (undated) DEVICE — SUTURE PROL SZ 8-0 L18IN NONABSORBABLE BLU L8MM BV175-6 3/8 8740H

## (undated) DEVICE — SUTURE PERMAHAND SZ 2-0 L18IN NONABSORBABLE BLK L26MM SH C012D

## (undated) DEVICE — GLOVE SURG SZ 65 L12IN FNGR THK87MIL WHT LTX FREE

## (undated) DEVICE — Z DISCONTINUED APPLICATOR SURG PREP 0.35OZ 2% CHG 70% ISO ALC W/ HI LT

## (undated) DEVICE — Z INACTIVE USE 2535480 CLIP LIG M BLU TI HRT SHP WIRE HORZ 180 PER BX

## (undated) DEVICE — GLOVE SURG SZ 75 L12IN FNGR THK87MIL DK GRN LTX FREE ISOLEX

## (undated) DEVICE — DISCONTINUED USE 405792 GLOVE SURG SENSICARE ALOE LT LF PF ST GRN SZ 7

## (undated) DEVICE — SUTURE PROL SZ 6-0 L18IN NONABSORBABLE BLU RB-2 L13MM 1/2 8714H

## (undated) DEVICE — CANNULA PERF L2IN BLNT TIP 2MM VES CLR RADPQ BODY FEM LUER

## (undated) DEVICE — 3M™ STERI-STRIP™ COMPOUND BENZOIN TINCTURE 40 BAGS/CARTON 4 CARTONS/CASE C1544: Brand: 3M™ STERI-STRIP™

## (undated) DEVICE — STABILIZER TISS CANSTR TBNG EVOLUTION AS OCTPS

## (undated) DEVICE — PROTECTOR ULN NRV PUR FOAM HK LOOP STRP ANATOMICALLY

## (undated) DEVICE — SUTURE PROL SZ 5-0 L30IN NONABSORBABLE BLU L13MM RB-2 1/2 8710H

## (undated) DEVICE — TRAY CATH 16FR COMPLT CARE URIN M TEMP SENS STATLOK STBL

## (undated) DEVICE — SUTURE VCRL + 1 L27IN ABSRB UD CT-1 L36MM 1/2 CIR TAPR PNT VCP261H

## (undated) DEVICE — GLOVE SURG SZ 8 L12IN FNGR THK87MIL WHT LTX FREE

## (undated) DEVICE — TOWEL,OR,DSP,ST,BLUE,DLX,XR,4/PK,20PK/CS: Brand: MEDLINE

## (undated) DEVICE — BLADE OPHTH D5MM 15DEG GRN W/ RND KNURLED HNDL MICRO-SHARP

## (undated) DEVICE — PACK PROCEDURE SURG OPN HRT ADD ON

## (undated) DEVICE — GEL US 20GM NONIRRITATING OVERWRAPPED FILE PCH TRNSMIT

## (undated) DEVICE — SUTURE PROL SZ 8-0 L18IN NONABSORBABLE BLU L6.5MM BV130-5 8730H

## (undated) DEVICE — STRIP,CLOSURE,WOUND,MEDI-STRIP,1/2X4: Brand: MEDLINE

## (undated) DEVICE — SUTURE PROL SZ 7-0 L24IN NONABSORBABLE BLU L8MM BV175-6 3/8 8735H

## (undated) DEVICE — BLADE SAW W6.35XL32MM STRNM CUT STRNOTMY

## (undated) DEVICE — SUTURE PERMAHAND SZ 4-0 L30IN NONABSORBABLE BLK L17MM RB-1 K871H

## (undated) DEVICE — PACK PROCEDURE SURG OPN HRT

## (undated) DEVICE — BLADE OPHTH ORNG GRINDLESS SMALLER ALTERNATIVE TO NO15 GEN

## (undated) DEVICE — GAUZE,SPONGE,4"X4",16PLY,XRAY,STRL,LF: Brand: MEDLINE

## (undated) DEVICE — CLIP INT M L GRN TI TRNSVRS GRV CHEVRON SHP W/ PRECIS TIP

## (undated) DEVICE — GOWN,AURORA,NONRNF,XL,30/CS: Brand: MEDLINE

## (undated) DEVICE — INSUFFLATION TUBING SET WITH FILTER, FUNNEL CONNECTOR AND LUER LOCK: Brand: JOSNOE MEDICAL INC

## (undated) DEVICE — DRAPE IRRIG FLD WRM W44XL66IN W/ AORN STD PRTBL INTRATEMP

## (undated) DEVICE — KIT BLWR MISTER 5P 15L W/ TBNG SET IRRIG MIST TO IMPROVE

## (undated) DEVICE — SUTURE VCRL + SZ 3-0 L27IN ABSRB WHT CT-1 1/2 CIR VCP258H

## (undated) DEVICE — RETRACTOR SURG INSRT SUT HLD OCTOBASE

## (undated) DEVICE — SUTURE VCRL + SZ 4-0 L18IN ABSRB UD L19MM PS-2 3/8 CIR PRIM VCP496H

## (undated) DEVICE — PLATELET CONCENTRATION PACK PROC 14-20 ML SMARTPREP 2

## (undated) DEVICE — SPONGE LAP W18XL18IN WHT COT 4 PLY FLD STRUNG RADPQ DISP ST

## (undated) DEVICE — TUBE CHST L20IN OD32FR SIL TAPR CONN TIP STR SFT RADPQ

## (undated) DEVICE — SUTURE VCRL + SZ 4-0 L27IN ABSRB UD L26MM SH 1/2 CIR VCP415H

## (undated) DEVICE — CATHETER THOR L21IN DIA32FR R ANG HYDRAGLIDE SFT RADPQ STRP

## (undated) DEVICE — GLOVE SURG SZ 7 L12IN FNGR THK87MIL WHT LTX FREE

## (undated) DEVICE — GLOVE SURG SZ 75 L12IN FNGR THK87MIL WHT LTX FREE

## (undated) DEVICE — HEADREST NEURO DONUT 7 IN